# Patient Record
Sex: FEMALE | Race: WHITE | NOT HISPANIC OR LATINO
[De-identification: names, ages, dates, MRNs, and addresses within clinical notes are randomized per-mention and may not be internally consistent; named-entity substitution may affect disease eponyms.]

---

## 2017-12-20 ENCOUNTER — APPOINTMENT (OUTPATIENT)
Dept: CARDIOLOGY | Facility: CLINIC | Age: 77
End: 2017-12-20
Payer: MEDICARE

## 2017-12-20 ENCOUNTER — NON-APPOINTMENT (OUTPATIENT)
Age: 77
End: 2017-12-20

## 2017-12-20 VITALS — BODY MASS INDEX: 24.07 KG/M2 | HEIGHT: 64 IN | HEART RATE: 90 BPM | OXYGEN SATURATION: 98 % | WEIGHT: 141 LBS

## 2017-12-20 VITALS — DIASTOLIC BLOOD PRESSURE: 50 MMHG | SYSTOLIC BLOOD PRESSURE: 116 MMHG

## 2017-12-20 DIAGNOSIS — Z87.81 PERSONAL HISTORY OF (HEALED) TRAUMATIC FRACTURE: ICD-10-CM

## 2017-12-20 DIAGNOSIS — Z80.1 FAMILY HISTORY OF MALIGNANT NEOPLASM OF TRACHEA, BRONCHUS AND LUNG: ICD-10-CM

## 2017-12-20 DIAGNOSIS — Z83.1 FAMILY HISTORY OF OTHER INFECTIOUS AND PARASITIC DISEASES: ICD-10-CM

## 2017-12-20 PROCEDURE — 93000 ELECTROCARDIOGRAM COMPLETE: CPT

## 2017-12-20 PROCEDURE — 99204 OFFICE O/P NEW MOD 45 MIN: CPT | Mod: 25

## 2018-02-06 ENCOUNTER — APPOINTMENT (OUTPATIENT)
Dept: CARDIOLOGY | Facility: CLINIC | Age: 78
End: 2018-02-06
Payer: MEDICARE

## 2018-02-06 PROCEDURE — 93306 TTE W/DOPPLER COMPLETE: CPT

## 2018-02-14 ENCOUNTER — APPOINTMENT (OUTPATIENT)
Dept: CARDIOLOGY | Facility: CLINIC | Age: 78
End: 2018-02-14
Payer: MEDICARE

## 2018-02-14 VITALS — HEIGHT: 64 IN | OXYGEN SATURATION: 98 % | WEIGHT: 141 LBS | HEART RATE: 65 BPM | BODY MASS INDEX: 24.07 KG/M2

## 2018-02-14 VITALS — DIASTOLIC BLOOD PRESSURE: 80 MMHG | SYSTOLIC BLOOD PRESSURE: 148 MMHG

## 2018-02-14 PROCEDURE — 99213 OFFICE O/P EST LOW 20 MIN: CPT

## 2018-12-20 ENCOUNTER — APPOINTMENT (OUTPATIENT)
Dept: CARDIOLOGY | Facility: CLINIC | Age: 78
End: 2018-12-20
Payer: MEDICARE

## 2018-12-20 ENCOUNTER — NON-APPOINTMENT (OUTPATIENT)
Age: 78
End: 2018-12-20

## 2018-12-20 VITALS — RESPIRATION RATE: 17 BRPM | HEART RATE: 81 BPM | OXYGEN SATURATION: 97 % | HEIGHT: 64 IN

## 2018-12-20 VITALS — SYSTOLIC BLOOD PRESSURE: 150 MMHG | DIASTOLIC BLOOD PRESSURE: 80 MMHG

## 2018-12-20 PROCEDURE — 99214 OFFICE O/P EST MOD 30 MIN: CPT

## 2018-12-20 PROCEDURE — 93000 ELECTROCARDIOGRAM COMPLETE: CPT

## 2018-12-20 NOTE — REVIEW OF SYSTEMS
[see HPI] : see HPI [Shortness Of Breath] : shortness of breath [Lower Ext Edema] : lower extremity edema [Negative] : Heme/Lymph

## 2018-12-22 NOTE — DISCUSSION/SUMMARY
[FreeTextEntry1] : Presently she is hemodynamically stable.  Blood pressure is not adequately controlled.I will try and get copies of medical records from [facility] to know exactly what happened l as well as blood test reports from [facility].  Based upon that I results,I will make further recommendations about blood pressure medications.  Medication list was reconciled..

## 2018-12-22 NOTE — HISTORY OF PRESENT ILLNESS
[FreeTextEntry1] : 2 days before Thanksgiving she started getting shortness of breath.  After Thanksgiving she was admitted to [facility] because of severe shortness of breath.  In the hospital they also noted leg edema and put her on diuretic.  She was told to have heart failure.  She was in the hospital for about 2 weeks.  She was also treated with high-dose short-term prednisone for exacerbation of COPD.Since discharge she has also been getting some swelling in the legs.  Legs were very swollen yesterday morning.  She is still on the tapering dose of prednisone.She had no chest pain.  She was also seen by cardiologist while in the hospital.

## 2018-12-22 NOTE — ASSESSMENT
[FreeTextEntry1] : She is hemodynamically stable.  As per the history she had congestive heart failure.  I am not entirely convinced about it.  She also had exacerbation of COPD.  There was no mention of any acute MI during her admission.

## 2018-12-22 NOTE — REASON FOR VISIT
[Follow-Up - Clinic] : a clinic follow-up of [Hyperlipidemia] : hyperlipidemia [Hypertension] : hypertension [FreeTextEntry1] : Asthma,copd,CHF

## 2018-12-22 NOTE — PHYSICAL EXAM
[General Appearance - Well Developed] : well developed [Normal Appearance] : normal appearance [Well Groomed] : well groomed [General Appearance - Well Nourished] : well nourished [No Deformities] : no deformities [General Appearance - In No Acute Distress] : no acute distress [Normal Conjunctiva] : the conjunctiva exhibited no abnormalities [Eyelids - No Xanthelasma] : the eyelids demonstrated no xanthelasmas [Normal Oral Mucosa] : normal oral mucosa [No Oral Pallor] : no oral pallor [No Oral Cyanosis] : no oral cyanosis [Normal Jugular Venous A Waves Present] : normal jugular venous A waves present [Normal Jugular Venous V Waves Present] : normal jugular venous V waves present [No Jugular Venous Irizarry A Waves] : no jugular venous irizarry A waves [Respiration, Rhythm And Depth] : normal respiratory rhythm and effort [Exaggerated Use Of Accessory Muscles For Inspiration] : no accessory muscle use [Auscultation Breath Sounds / Voice Sounds] : lungs were clear to auscultation bilaterally [Heart Rate And Rhythm] : heart rate and rhythm were normal [Heart Sounds] : normal S1 and S2 [Murmurs] : no murmurs present [Abdomen Soft] : soft [Abdomen Tenderness] : non-tender [Abdomen Mass (___ Cm)] : no abdominal mass palpated [Abnormal Walk] : normal gait [Gait - Sufficient For Exercise Testing] : the gait was sufficient for exercise testing [Nail Clubbing] : no clubbing of the fingernails [Cyanosis, Localized] : no localized cyanosis [Petechial Hemorrhages (___cm)] : no petechial hemorrhages [Skin Color & Pigmentation] : normal skin color and pigmentation [] : no rash [No Venous Stasis] : no venous stasis [Skin Lesions] : no skin lesions [No Skin Ulcers] : no skin ulcer [No Xanthoma] : no  xanthoma was observed [Oriented To Time, Place, And Person] : oriented to person, place, and time [Affect] : the affect was normal [Mood] : the mood was normal [No Anxiety] : not feeling anxious [FreeTextEntry1] : Minimal bilateral edema.

## 2018-12-29 ENCOUNTER — MOBILE ON CALL (OUTPATIENT)
Age: 78
End: 2018-12-29

## 2019-01-07 ENCOUNTER — APPOINTMENT (OUTPATIENT)
Dept: CARDIOLOGY | Facility: CLINIC | Age: 79
End: 2019-01-07

## 2019-01-09 ENCOUNTER — APPOINTMENT (OUTPATIENT)
Dept: CARDIOLOGY | Facility: CLINIC | Age: 79
End: 2019-01-09
Payer: MEDICARE

## 2019-01-09 ENCOUNTER — LABORATORY RESULT (OUTPATIENT)
Age: 79
End: 2019-01-09

## 2019-01-09 ENCOUNTER — APPOINTMENT (OUTPATIENT)
Dept: INTERNAL MEDICINE | Facility: CLINIC | Age: 79
End: 2019-01-09
Payer: MEDICARE

## 2019-01-09 VITALS — SYSTOLIC BLOOD PRESSURE: 154 MMHG | DIASTOLIC BLOOD PRESSURE: 80 MMHG

## 2019-01-09 VITALS — OXYGEN SATURATION: 98 % | HEART RATE: 87 BPM | HEIGHT: 64 IN | BODY MASS INDEX: 28.34 KG/M2 | WEIGHT: 166 LBS

## 2019-01-09 VITALS
WEIGHT: 166 LBS | HEART RATE: 78 BPM | BODY MASS INDEX: 28.34 KG/M2 | OXYGEN SATURATION: 97 % | TEMPERATURE: 98.8 F | HEIGHT: 64 IN

## 2019-01-09 VITALS — SYSTOLIC BLOOD PRESSURE: 130 MMHG | DIASTOLIC BLOOD PRESSURE: 80 MMHG

## 2019-01-09 VITALS — SYSTOLIC BLOOD PRESSURE: 140 MMHG | DIASTOLIC BLOOD PRESSURE: 70 MMHG

## 2019-01-09 DIAGNOSIS — E86.0 DEHYDRATION: ICD-10-CM

## 2019-01-09 PROCEDURE — 99496 TRANSJ CARE MGMT HIGH F2F 7D: CPT

## 2019-01-09 PROCEDURE — 36415 COLL VENOUS BLD VENIPUNCTURE: CPT

## 2019-01-09 PROCEDURE — 99214 OFFICE O/P EST MOD 30 MIN: CPT | Mod: 25

## 2019-01-09 RX ORDER — ASPIRIN 81 MG
81 TABLET, DELAYED RELEASE (ENTERIC COATED) ORAL
Refills: 0 | Status: ACTIVE | COMMUNITY

## 2019-01-09 NOTE — REASON FOR VISIT
[Follow-Up - Clinic] : a clinic follow-up of [Hyperlipidemia] : hyperlipidemia [Hypertension] : hypertension [FreeTextEntry1] : Exacerbation of COPD, diabetes etc.

## 2019-01-09 NOTE — DISCUSSION/SUMMARY
[FreeTextEntry1] : I recommended that she see Dr. Pacheco get her medications straightened out.  She also needs to follow-up with Dr. Kelsey and get better control of her blood sugar.  I told her to discontinue Lasix for the time being.  I did not make any other changes.  I'll follow-up in 2 weeks and then make a decision regarding her medications.  All of this was explained to her daughter.  I also think that it'll be a good idea for her to be on a low-dose antidepressant as proposed by daughter.  I think it'll be extremely beneficial.  Until she is cleared once again by neurology she has been told that she cannot drive a car and that is upsetting her.

## 2019-01-09 NOTE — ASSESSMENT
[FreeTextEntry1] : currently appears clinically stable\par there are at least 3 different med lists and daughter stated she would put most recent meds in a bag and bring them to office tomorrow.\par will stop lasix now given BUN elevation.

## 2019-01-09 NOTE — HISTORY OF PRESENT ILLNESS
[FreeTextEntry1] : She was admitted to [facility] for exacerbation of COPD.  Week earlier she had been discharged from [facility] with the same diagnoses.  She was discharged finally on 28 December.  On 7 January when she was on her way to the office she turned gray.  She was also dizzy and short of breath.  She was taken to [facility] emergency room where her blood pressure was 123/53.\par \par The first doctor who saw her wanted to admit her overnight in evaluate and see she needed therapy.  However when his shift was over and some other doctor to go over he decided to discharge her the same night.\par \par Since discharge she has not been feeling her normal self.  Her breathing is okay.  She finished prednisone On second of January.  Blood tests done on 7 January at [facility] showed blood glucose of 353 with BUN of 70 and creatinine of 1.5.  Ago the high blood sugar me still have been from their high dose prednisone that she had been getting.\par \par She has been drinking a good amount of water.  Her fingers sometimes cramp up.  Her current medications include amlodipine 10 mg daily, Brilinta 90 mg twice a day, flaxseed 5 mg once a day, ramipril 10 mg daily and furosemide 40 mg once a day.She is also on Levemir 20 units at bedtime, Repaglinide 2 mg 3 times a day\par \par She has an extensive list of medications and I am not sure what she is taking and what is not.

## 2019-01-09 NOTE — PLAN
[FreeTextEntry1] : blood sent for routine labs\par decision re meds with labs and current meds tomorrow

## 2019-01-09 NOTE — ASSESSMENT
[FreeTextEntry1] : Her blood pressure is slightly because of anxiety and stress.  She appears dehydrated.  Diabetes is out of control.  She is also taking Lasix.  She is on multiple medications and I'm not sure which one she is taking and which ones she is not.

## 2019-01-09 NOTE — HEALTH RISK ASSESSMENT
[Any fall with injury in past year] : Patient reported fall with injury in the past year [0] : 1) Little interest or pleasure doing things: Not at all (0) [3] : 2) Feeling down, depressed, or hopeless for nearly every day (3) [] : No [de-identified] : Tripped and fractured her arm. [[id number]] : 3

## 2019-01-09 NOTE — PHYSICAL EXAM
[No Acute Distress] : no acute distress [Well-Appearing] : well-appearing [No Respiratory Distress] : no respiratory distress  [Clear to Auscultation] : lungs were clear to auscultation bilaterally [Soft] : abdomen soft [Non Tender] : non-tender [No Focal Deficits] : no focal deficits [Alert and Oriented x3] : oriented to person, place, and time [Normal Sclera/Conjunctiva] : normal sclera/conjunctiva [Normal Rate] : normal rate  [Regular Rhythm] : with a regular rhythm [No Edema] : there was no peripheral edema [Normal Supraclavicular Nodes] : no supraclavicular lymphadenopathy [Normal Posterior Cervical Nodes] : no posterior cervical lymphadenopathy [No CVA Tenderness] : no CVA  tenderness [No Joint Swelling] : no joint swelling [Normal Affect] : the affect was normal

## 2019-01-09 NOTE — REVIEW OF SYSTEMS
[Fever] : no fever [Chest Pain] : no chest pain [Shortness Of Breath] : no shortness of breath [Dysuria] : no dysuria [Joint Pain] : no joint pain [Anxiety] : anxiety [Depression] : depression

## 2019-01-09 NOTE — HISTORY OF PRESENT ILLNESS
[FreeTextEntry1] : follow up visit. only acute complaint of depression at times.  [de-identified] : 77 yo female with complex recent history including 2 recent hospitalizatios[[facility] and [facility]] with past history diabetes/COPD/HTN/CAD.\par seen earlier today and case d/w cardiology, multiple lists of meds and neither the patient or the daughter are clear as to what she has been taking recently. given elevated BUN at [facility] was told to stop the lasix.  \par cuerrentlt awake alert no distress

## 2019-01-10 ENCOUNTER — MEDICATION RENEWAL (OUTPATIENT)
Age: 79
End: 2019-01-10

## 2019-01-10 ENCOUNTER — RX CHANGE (OUTPATIENT)
Age: 79
End: 2019-01-10

## 2019-01-10 RX ORDER — INSULIN GLARGINE 100 [IU]/ML
INJECTION, SOLUTION SUBCUTANEOUS
Refills: 0 | Status: DISCONTINUED | COMMUNITY
End: 2019-01-10

## 2019-01-23 ENCOUNTER — MEDICATION RENEWAL (OUTPATIENT)
Age: 79
End: 2019-01-23

## 2019-01-28 ENCOUNTER — APPOINTMENT (OUTPATIENT)
Dept: INTERNAL MEDICINE | Facility: CLINIC | Age: 79
End: 2019-01-28
Payer: MEDICARE

## 2019-01-28 ENCOUNTER — APPOINTMENT (OUTPATIENT)
Dept: CARDIOLOGY | Facility: CLINIC | Age: 79
End: 2019-01-28
Payer: MEDICARE

## 2019-01-28 ENCOUNTER — LABORATORY RESULT (OUTPATIENT)
Age: 79
End: 2019-01-28

## 2019-01-28 VITALS
BODY MASS INDEX: 28.68 KG/M2 | OXYGEN SATURATION: 98 % | RESPIRATION RATE: 16 BRPM | HEIGHT: 64 IN | HEART RATE: 64 BPM | WEIGHT: 168 LBS

## 2019-01-28 VITALS
BODY MASS INDEX: 28.68 KG/M2 | OXYGEN SATURATION: 98 % | WEIGHT: 168 LBS | HEIGHT: 64 IN | TEMPERATURE: 97.9 F | HEART RATE: 64 BPM

## 2019-01-28 VITALS — SYSTOLIC BLOOD PRESSURE: 128 MMHG | DIASTOLIC BLOOD PRESSURE: 50 MMHG

## 2019-01-28 VITALS — SYSTOLIC BLOOD PRESSURE: 128 MMHG | DIASTOLIC BLOOD PRESSURE: 80 MMHG

## 2019-01-28 DIAGNOSIS — R42 DIZZINESS AND GIDDINESS: ICD-10-CM

## 2019-01-28 PROCEDURE — 36415 COLL VENOUS BLD VENIPUNCTURE: CPT

## 2019-01-28 PROCEDURE — 99214 OFFICE O/P EST MOD 30 MIN: CPT

## 2019-01-28 PROCEDURE — 99214 OFFICE O/P EST MOD 30 MIN: CPT | Mod: 25

## 2019-01-28 RX ORDER — DAPAGLIFLOZIN 5 MG/1
5 TABLET, FILM COATED ORAL DAILY
Refills: 0 | Status: DISCONTINUED | COMMUNITY
End: 2019-01-28

## 2019-01-28 NOTE — PHYSICAL EXAM
[No Acute Distress] : no acute distress [No JVD] : no jugular venous distention [No Respiratory Distress] : no respiratory distress  [Clear to Auscultation] : lungs were clear to auscultation bilaterally [Normal Rate] : normal rate  [Regular Rhythm] : with a regular rhythm [No Edema] : there was no peripheral edema [Soft] : abdomen soft [Non Tender] : non-tender [No Focal Deficits] : no focal deficits [Alert and Oriented x3] : oriented to person, place, and time

## 2019-01-28 NOTE — HISTORY OF PRESENT ILLNESS
[FreeTextEntry1] : She is presently asymptomatic.  In early January she was gray and dizzy and tired.  She saw Dr. Pacheco at that time.  It seemed like there is some confusion about what medications to take.  Blood tests were done. Hemoglobin was 11.2, vitamin D level was 14.2 and was low, hemoglobin A1c was 10.3, triglycerides of 198, total cholesterol 293 and .  HDL was 75.Potassium was 5.9, glucose was 260, BUN 44 and creatinine 1.29.  Estimated GFR was 40 consistent with chronic kidney disease stage III.\par \par Dr. Pacheco stopped few of her meds and gave her Kayexalate.  She is getting repeat potassium level today.

## 2019-01-28 NOTE — REASON FOR VISIT
[Follow-Up - Clinic] : a clinic follow-up of [Dyspnea] : dyspnea [Heart Failure] : congestive heart failure [Hyperlipidemia] : hyperlipidemia [Hypertension] : hypertension [FreeTextEntry1] : DM, COPD hyperkalemia.

## 2019-01-28 NOTE — ASSESSMENT
[FreeTextEntry1] : This is a 79 yo female with multiple medical problems presenting today for routine evaluation. No acute distress or medical complaints are noted at this time.

## 2019-01-28 NOTE — HEALTH RISK ASSESSMENT
[No falls in past year] : Patient reported no falls in the past year [0] : 2) Feeling down, depressed, or hopeless: Not at all (0) [] : No [[id number]] : 0

## 2019-01-28 NOTE — PHYSICAL EXAM
[General Appearance - Well Developed] : well developed [Normal Appearance] : normal appearance [Well Groomed] : well groomed [General Appearance - Well Nourished] : well nourished [No Deformities] : no deformities [General Appearance - In No Acute Distress] : no acute distress [Normal Conjunctiva] : the conjunctiva exhibited no abnormalities [Eyelids - No Xanthelasma] : the eyelids demonstrated no xanthelasmas [Normal Oral Mucosa] : normal oral mucosa [No Oral Pallor] : no oral pallor [No Oral Cyanosis] : no oral cyanosis [Normal Jugular Venous A Waves Present] : normal jugular venous A waves present [Normal Jugular Venous V Waves Present] : normal jugular venous V waves present [No Jugular Venous Iirzarry A Waves] : no jugular venous irizarry A waves [Respiration, Rhythm And Depth] : normal respiratory rhythm and effort [Exaggerated Use Of Accessory Muscles For Inspiration] : no accessory muscle use [Auscultation Breath Sounds / Voice Sounds] : lungs were clear to auscultation bilaterally [Heart Rate And Rhythm] : heart rate and rhythm were normal [Heart Sounds] : normal S1 and S2 [Murmurs] : no murmurs present [Abdomen Soft] : soft [Abdomen Tenderness] : non-tender [Abdomen Mass (___ Cm)] : no abdominal mass palpated [Abnormal Walk] : normal gait [Gait - Sufficient For Exercise Testing] : the gait was sufficient for exercise testing [Nail Clubbing] : no clubbing of the fingernails [Cyanosis, Localized] : no localized cyanosis [Petechial Hemorrhages (___cm)] : no petechial hemorrhages [FreeTextEntry1] : Minimal bilateral edema. [Skin Color & Pigmentation] : normal skin color and pigmentation [] : no rash [No Venous Stasis] : no venous stasis [Skin Lesions] : no skin lesions [No Skin Ulcers] : no skin ulcer [No Xanthoma] : no  xanthoma was observed [Oriented To Time, Place, And Person] : oriented to person, place, and time [Affect] : the affect was normal [Mood] : the mood was normal [No Anxiety] : not feeling anxious

## 2019-01-28 NOTE — HISTORY OF PRESENT ILLNESS
[FreeTextEntry1] : routine check up.  [de-identified] : 79 yo female is in no acute distress or has any medical complaints today. Patient is here for routine evaluation and blood work for follow up of hyperkalemia

## 2019-01-28 NOTE — REVIEW OF SYSTEMS
[Fever] : no fever [Chest Pain] : no chest pain [Shortness Of Breath] : no shortness of breath [Wheezing] : no wheezing [Abdominal Pain] : no abdominal pain [Dysuria] : no dysuria

## 2019-01-28 NOTE — ASSESSMENT
[FreeTextEntry1] : Her blood pressure is okay.  She doesn't appear to be in any fluid overload.  She was treated for hyperkalemia which is probably from ramipril. She was told to stop ramipril.This is to be some confusion.  She may have continued to take ramipril until today.She was also dehydrated and that may also have contributed to hyperkalemia.\par \par

## 2019-02-06 LAB
25(OH)D3 SERPL-MCNC: 14.2 NG/ML
25(OH)D3 SERPL-MCNC: 19.8 NG/ML
ALBUMIN SERPL ELPH-MCNC: 3.8 G/DL
ALBUMIN SERPL ELPH-MCNC: 4.1 G/DL
ALP BLD-CCNC: 106 U/L
ALP BLD-CCNC: 99 U/L
ALT SERPL-CCNC: 14 U/L
ALT SERPL-CCNC: 8 U/L
ANION GAP SERPL CALC-SCNC: 15 MMOL/L
ANION GAP SERPL CALC-SCNC: 20 MMOL/L
AST SERPL-CCNC: 16 U/L
AST SERPL-CCNC: 20 U/L
BASOPHILS # BLD AUTO: 0 K/UL
BASOPHILS # BLD AUTO: 0 K/UL
BASOPHILS NFR BLD AUTO: 0 %
BASOPHILS NFR BLD AUTO: 0 %
BILIRUB SERPL-MCNC: 0.3 MG/DL
BILIRUB SERPL-MCNC: 0.5 MG/DL
BUN SERPL-MCNC: 32 MG/DL
BUN SERPL-MCNC: 44 MG/DL
CALCIUM SERPL-MCNC: 8.7 MG/DL
CALCIUM SERPL-MCNC: 9.3 MG/DL
CHLORIDE SERPL-SCNC: 100 MMOL/L
CHLORIDE SERPL-SCNC: 108 MMOL/L
CHOLEST SERPL-MCNC: 282 MG/DL
CHOLEST SERPL-MCNC: 293 MG/DL
CHOLEST/HDLC SERPL: 3.9 RATIO
CHOLEST/HDLC SERPL: 4.9 RATIO
CO2 SERPL-SCNC: 17 MMOL/L
CO2 SERPL-SCNC: 18 MMOL/L
CREAT SERPL-MCNC: 1.16 MG/DL
CREAT SERPL-MCNC: 1.29 MG/DL
EOSINOPHIL # BLD AUTO: 0 K/UL
EOSINOPHIL # BLD AUTO: 0.08 K/UL
EOSINOPHIL NFR BLD AUTO: 0 %
EOSINOPHIL NFR BLD AUTO: 0.9 %
GLUCOSE SERPL-MCNC: 105 MG/DL
GLUCOSE SERPL-MCNC: 260 MG/DL
HBA1C MFR BLD HPLC: 10.3 %
HBA1C MFR BLD HPLC: 8.9 %
HCT VFR BLD CALC: 33.8 %
HCT VFR BLD CALC: 35.3 %
HDLC SERPL-MCNC: 58 MG/DL
HDLC SERPL-MCNC: 75 MG/DL
HGB BLD-MCNC: 11 G/DL
HGB BLD-MCNC: 11.2 G/DL
LDLC SERPL CALC-MCNC: 178 MG/DL
LDLC SERPL CALC-MCNC: 196 MG/DL
LYMPHOCYTES # BLD AUTO: 1.37 K/UL
LYMPHOCYTES # BLD AUTO: 2.13 K/UL
LYMPHOCYTES NFR BLD AUTO: 15.7 %
LYMPHOCYTES NFR BLD AUTO: 25 %
MAN DIFF?: NORMAL
MAN DIFF?: NORMAL
MCHC RBC-ENTMCNC: 28.9 PG
MCHC RBC-ENTMCNC: 28.9 PG
MCHC RBC-ENTMCNC: 31.7 GM/DL
MCHC RBC-ENTMCNC: 32.5 GM/DL
MCV RBC AUTO: 88.9 FL
MCV RBC AUTO: 91.2 FL
MONOCYTES # BLD AUTO: 0.61 K/UL
MONOCYTES # BLD AUTO: 0.89 K/UL
MONOCYTES NFR BLD AUTO: 10.5 %
MONOCYTES NFR BLD AUTO: 7 %
NEUTROPHILS # BLD AUTO: 5.41 K/UL
NEUTROPHILS # BLD AUTO: 6.54 K/UL
NEUTROPHILS NFR BLD AUTO: 54 %
NEUTROPHILS NFR BLD AUTO: 74.7 %
PLATELET # BLD AUTO: 265 K/UL
PLATELET # BLD AUTO: 399 K/UL
POTASSIUM SERPL-SCNC: 5 MMOL/L
POTASSIUM SERPL-SCNC: 5.9 MMOL/L
PROT SERPL-MCNC: 5.9 G/DL
PROT SERPL-MCNC: 6.2 G/DL
RBC # BLD: 3.8 M/UL
RBC # BLD: 3.87 M/UL
RBC # FLD: 14.8 %
RBC # FLD: 15.1 %
SODIUM SERPL-SCNC: 137 MMOL/L
SODIUM SERPL-SCNC: 141 MMOL/L
T4 FREE SERPL-MCNC: 1.2 NG/DL
T4 SERPL-MCNC: 7 UG/DL
T4 SERPL-MCNC: 7.8 UG/DL
TRIGL SERPL-MCNC: 138 MG/DL
TRIGL SERPL-MCNC: 198 MG/DL
TSH SERPL-ACNC: 1.99 UIU/ML
TSH SERPL-ACNC: 2.67 UIU/ML
WBC # FLD AUTO: 8.5 K/UL
WBC # FLD AUTO: 8.75 K/UL

## 2019-02-11 ENCOUNTER — MEDICATION RENEWAL (OUTPATIENT)
Age: 79
End: 2019-02-11

## 2019-02-11 RX ORDER — ROSUVASTATIN CALCIUM 10 MG/1
10 TABLET, FILM COATED ORAL DAILY
Qty: 30 | Refills: 2 | Status: DISCONTINUED | COMMUNITY
Start: 2019-02-06 | End: 2019-02-11

## 2019-02-21 ENCOUNTER — RX RENEWAL (OUTPATIENT)
Age: 79
End: 2019-02-21

## 2019-02-25 ENCOUNTER — APPOINTMENT (OUTPATIENT)
Dept: CARDIOLOGY | Facility: CLINIC | Age: 79
End: 2019-02-25

## 2019-03-07 ENCOUNTER — RX RENEWAL (OUTPATIENT)
Age: 79
End: 2019-03-07

## 2019-03-07 ENCOUNTER — MEDICATION RENEWAL (OUTPATIENT)
Age: 79
End: 2019-03-07

## 2019-03-24 ENCOUNTER — RX RENEWAL (OUTPATIENT)
Age: 79
End: 2019-03-24

## 2019-04-02 ENCOUNTER — APPOINTMENT (OUTPATIENT)
Dept: INTERNAL MEDICINE | Facility: CLINIC | Age: 79
End: 2019-04-02
Payer: MEDICARE

## 2019-04-02 ENCOUNTER — RX RENEWAL (OUTPATIENT)
Age: 79
End: 2019-04-02

## 2019-04-02 PROCEDURE — 36415 COLL VENOUS BLD VENIPUNCTURE: CPT

## 2019-04-03 LAB
ALBUMIN SERPL ELPH-MCNC: 3.9 G/DL
ALP BLD-CCNC: 114 U/L
ALT SERPL-CCNC: 8 U/L
ANION GAP SERPL CALC-SCNC: 16 MMOL/L
AST SERPL-CCNC: 18 U/L
BILIRUB SERPL-MCNC: 0.2 MG/DL
BUN SERPL-MCNC: 21 MG/DL
CALCIUM SERPL-MCNC: 8.3 MG/DL
CHLORIDE SERPL-SCNC: 109 MMOL/L
CHOLEST SERPL-MCNC: 201 MG/DL
CHOLEST/HDLC SERPL: 3.1 RATIO
CO2 SERPL-SCNC: 20 MMOL/L
CREAT SERPL-MCNC: 1.2 MG/DL
ESTIMATED AVERAGE GLUCOSE: 157 MG/DL
GLUCOSE SERPL-MCNC: 103 MG/DL
HBA1C MFR BLD HPLC: 7.1 %
HDLC SERPL-MCNC: 64 MG/DL
LDLC SERPL CALC-MCNC: 113 MG/DL
POTASSIUM SERPL-SCNC: 3.9 MMOL/L
PROT SERPL-MCNC: 6 G/DL
SODIUM SERPL-SCNC: 145 MMOL/L
TRIGL SERPL-MCNC: 119 MG/DL

## 2019-04-08 ENCOUNTER — RX RENEWAL (OUTPATIENT)
Age: 79
End: 2019-04-08

## 2019-04-08 ENCOUNTER — APPOINTMENT (OUTPATIENT)
Dept: INTERNAL MEDICINE | Facility: CLINIC | Age: 79
End: 2019-04-08
Payer: MEDICARE

## 2019-04-08 VITALS
BODY MASS INDEX: 27.31 KG/M2 | HEIGHT: 64 IN | OXYGEN SATURATION: 97 % | HEART RATE: 65 BPM | WEIGHT: 160 LBS | TEMPERATURE: 97.5 F

## 2019-04-08 VITALS — DIASTOLIC BLOOD PRESSURE: 70 MMHG | SYSTOLIC BLOOD PRESSURE: 110 MMHG

## 2019-04-08 PROCEDURE — 93000 ELECTROCARDIOGRAM COMPLETE: CPT

## 2019-04-08 PROCEDURE — 99214 OFFICE O/P EST MOD 30 MIN: CPT | Mod: 25

## 2019-04-08 NOTE — ASSESSMENT
[FreeTextEntry1] : 77 y/o female presents for routine follow-up and to discuss blood work.\par Patients blood pressure is well-controlled with medications and lifestyle changes.\par Patients cholesterol levels and blood glucose levels are also well controlled with medications.\par Upon evaluation, occasional skipped heart beat was auscultated and EKG was ordered to evaluate. \par No other acute medical complaints are expressed by patient.

## 2019-04-08 NOTE — PHYSICAL EXAM
[No Acute Distress] : no acute distress [Well Developed] : well developed [Well-Appearing] : well-appearing [No JVD] : no jugular venous distention [No Respiratory Distress] : no respiratory distress  [Clear to Auscultation] : lungs were clear to auscultation bilaterally [No Accessory Muscle Use] : no accessory muscle use [Normal Rate] : normal rate  [No Murmur] : no murmur heard [No Edema] : there was no peripheral edema [Soft] : abdomen soft [Non Tender] : non-tender [No Focal Deficits] : no focal deficits [Normal Affect] : the affect was normal [Alert and Oriented x3] : oriented to person, place, and time [de-identified] : occasional skipped heart beat auscultated.

## 2019-04-08 NOTE — REVIEW OF SYSTEMS
[Hearing Loss] : hearing loss [Negative] : Heme/Lymph [Fever] : no fever [Chills] : no chills [Fatigue] : no fatigue [Discharge] : no discharge [Pain] : no pain [Earache] : no earache [Hoarseness] : no hoarseness [Chest Pain] : no chest pain [Palpitations] : no palpitations [Shortness Of Breath] : no shortness of breath [Wheezing] : no wheezing [Cough] : no cough [Abdominal Pain] : no abdominal pain [Diarrhea] : diarrhea [Vomiting] : no vomiting [Dysuria] : no dysuria

## 2019-04-08 NOTE — PLAN
[FreeTextEntry1] : EKG done showed NSR.\par Continue medications as listed.\par RTC in 6 months. \par

## 2019-04-08 NOTE — HISTORY OF PRESENT ILLNESS
[FreeTextEntry1] : 77 y/o female presents for routine follow up.  [de-identified] : Ms. Isaac is a 79 y/o female who presents to the office for a routine follow up, mainly to discuss blood work results.\par Patient states she has lost about 5-8 pounds and that she handles her own medications. \par No other acute medical concerns are expressed by the patient. \par

## 2019-04-08 NOTE — HEALTH RISK ASSESSMENT
[No falls in past year] : Patient reported no falls in the past year [0] : 2) Feeling down, depressed, or hopeless: Not at all (0) [] : No [[id number]] : 0

## 2019-04-10 ENCOUNTER — APPOINTMENT (OUTPATIENT)
Dept: INTERNAL MEDICINE | Facility: CLINIC | Age: 79
End: 2019-04-10

## 2019-04-25 ENCOUNTER — MEDICATION RENEWAL (OUTPATIENT)
Age: 79
End: 2019-04-25

## 2019-05-01 ENCOUNTER — MEDICATION RENEWAL (OUTPATIENT)
Age: 79
End: 2019-05-01

## 2019-05-01 ENCOUNTER — RX CHANGE (OUTPATIENT)
Age: 79
End: 2019-05-01

## 2019-05-15 ENCOUNTER — APPOINTMENT (OUTPATIENT)
Dept: INTERNAL MEDICINE | Facility: CLINIC | Age: 79
End: 2019-05-15
Payer: MEDICARE

## 2019-05-15 VITALS
HEIGHT: 64 IN | HEART RATE: 70 BPM | TEMPERATURE: 98.2 F | OXYGEN SATURATION: 98 % | BODY MASS INDEX: 26.8 KG/M2 | WEIGHT: 157 LBS

## 2019-05-15 VITALS — DIASTOLIC BLOOD PRESSURE: 80 MMHG | SYSTOLIC BLOOD PRESSURE: 120 MMHG

## 2019-05-15 DIAGNOSIS — Z86.73 PERSONAL HISTORY OF TRANSIENT ISCHEMIC ATTACK (TIA), AND CEREBRAL INFARCTION W/OUT RESIDUAL DEFICITS: ICD-10-CM

## 2019-05-15 DIAGNOSIS — Z87.891 PERSONAL HISTORY OF NICOTINE DEPENDENCE: ICD-10-CM

## 2019-05-15 PROCEDURE — 36415 COLL VENOUS BLD VENIPUNCTURE: CPT

## 2019-05-15 PROCEDURE — 99496 TRANSJ CARE MGMT HIGH F2F 7D: CPT | Mod: 25

## 2019-05-15 NOTE — PLAN
[FreeTextEntry1] : -Pt will call the office for medication refills. \par -Arrange for home visiting nurse referral. \par -Routine blood work sent to lab. \par -Follow up in 1 month.

## 2019-05-15 NOTE — ASSESSMENT
[FreeTextEntry1] : 77 yo female presenting s/p hospital d/c for TIA on 5/2/19.  \par Routine blood work to be drawn including b12 and folate, cbc and iron studies.  \par At the time of the visit, pt is neurologically intact without any deficits.

## 2019-05-15 NOTE — HISTORY OF PRESENT ILLNESS
[Admitted on: ___] : The patient was admitted on [unfilled] [Post-hospitalization from ___ Hospital] : Post-hospitalization from [unfilled] Hospital [Discharged on ___] : discharged on [unfilled] [FreeTextEntry2] : Ms. Isaac is a 79 yo female with history of asthma and T2DM presenting for hospital follow up d/c on 5/2/19 for TIA.\par Pt states she is feeling fatigued since being discharged, otherwise denies acute medical complaints.  \par Pt denies dysphagia, confusion, vision loss or paralysis.

## 2019-05-15 NOTE — PHYSICAL EXAM
[No Acute Distress] : no acute distress [Well Nourished] : well nourished [Well-Appearing] : well-appearing [Well Developed] : well developed [Normal Sclera/Conjunctiva] : normal sclera/conjunctiva [Normal Outer Ear/Nose] : the outer ears and nose were normal in appearance [No JVD] : no jugular venous distention [Clear to Auscultation] : lungs were clear to auscultation bilaterally [No Accessory Muscle Use] : no accessory muscle use [No Respiratory Distress] : no respiratory distress  [Regular Rhythm] : with a regular rhythm [Normal Rate] : normal rate  [Normal S1, S2] : normal S1 and S2 [No Murmur] : no murmur heard [No Varicosities] : no varicosities [No Edema] : there was no peripheral edema [No Extremity Clubbing/Cyanosis] : no extremity clubbing/cyanosis [Soft] : abdomen soft [Non Tender] : non-tender [Non-distended] : non-distended [No Masses] : no abdominal mass palpated [No HSM] : no HSM [Normal Bowel Sounds] : normal bowel sounds [No Joint Swelling] : no joint swelling [Grossly Normal Strength/Tone] : grossly normal strength/tone [No Rash] : no rash [Normal Gait] : normal gait [Coordination Grossly Intact] : coordination grossly intact [No Focal Deficits] : no focal deficits [Normal Affect] : the affect was normal [Alert and Oriented x3] : oriented to person, place, and time [Normal Insight/Judgement] : insight and judgment were intact [Speech Grossly Normal] : speech grossly normal [Memory Grossly Normal] : memory grossly normal

## 2019-05-23 NOTE — DISCUSSION/SUMMARY
[FreeTextEntry1] : I agree that she should remain off ramipril for the time being.  I'll follow-up in a month.  She should still have basic metabolic panel done today.\par \par She tried making appointments with Dr. Kelsey but was unsuccessful.  I gave her the name of Dr. Shirley.Her medication list was reconciled.
20

## 2019-05-28 ENCOUNTER — MEDICATION RENEWAL (OUTPATIENT)
Age: 79
End: 2019-05-28

## 2019-05-28 ENCOUNTER — RX RENEWAL (OUTPATIENT)
Age: 79
End: 2019-05-28

## 2019-06-09 LAB
ALBUMIN SERPL ELPH-MCNC: 4.2 G/DL
ALP BLD-CCNC: 86 U/L
ALT SERPL-CCNC: 7 U/L
ANION GAP SERPL CALC-SCNC: 14 MMOL/L
AST SERPL-CCNC: 18 U/L
BASOPHILS # BLD AUTO: 0.07 K/UL
BASOPHILS NFR BLD AUTO: 0.6 %
BILIRUB SERPL-MCNC: 0.3 MG/DL
BUN SERPL-MCNC: 34 MG/DL
CALCIUM SERPL-MCNC: 9 MG/DL
CHLORIDE SERPL-SCNC: 110 MMOL/L
CHOLEST SERPL-MCNC: 165 MG/DL
CHOLEST/HDLC SERPL: 2.3 RATIO
CO2 SERPL-SCNC: 18 MMOL/L
CREAT SERPL-MCNC: 1.28 MG/DL
EOSINOPHIL # BLD AUTO: 0.25 K/UL
EOSINOPHIL NFR BLD AUTO: 2.3 %
ESTIMATED AVERAGE GLUCOSE: 189 MG/DL
GLUCOSE SERPL-MCNC: 81 MG/DL
HBA1C MFR BLD HPLC: 8.2 %
HCT VFR BLD CALC: 39.4 %
HDLC SERPL-MCNC: 73 MG/DL
HGB BLD-MCNC: 12.2 G/DL
IMM GRANULOCYTES NFR BLD AUTO: 0.6 %
LDLC SERPL CALC-MCNC: 75 MG/DL
LYMPHOCYTES # BLD AUTO: 1.99 K/UL
LYMPHOCYTES NFR BLD AUTO: 18.3 %
MAN DIFF?: NORMAL
MCHC RBC-ENTMCNC: 28.2 PG
MCHC RBC-ENTMCNC: 31 GM/DL
MCV RBC AUTO: 91 FL
MONOCYTES # BLD AUTO: 0.6 K/UL
MONOCYTES NFR BLD AUTO: 5.5 %
NEUTROPHILS # BLD AUTO: 7.88 K/UL
NEUTROPHILS NFR BLD AUTO: 72.7 %
PLATELET # BLD AUTO: 422 K/UL
POTASSIUM SERPL-SCNC: 4.6 MMOL/L
PROT SERPL-MCNC: 6.4 G/DL
RBC # BLD: 4.33 M/UL
RBC # FLD: 14.6 %
SODIUM SERPL-SCNC: 142 MMOL/L
TRIGL SERPL-MCNC: 86 MG/DL
TSH SERPL-ACNC: 3.14 UIU/ML
WBC # FLD AUTO: 10.86 K/UL

## 2019-06-12 ENCOUNTER — APPOINTMENT (OUTPATIENT)
Dept: INTERNAL MEDICINE | Facility: CLINIC | Age: 79
End: 2019-06-12
Payer: MEDICARE

## 2019-06-12 VITALS — SYSTOLIC BLOOD PRESSURE: 130 MMHG | DIASTOLIC BLOOD PRESSURE: 80 MMHG

## 2019-06-12 VITALS
HEART RATE: 68 BPM | WEIGHT: 161 LBS | BODY MASS INDEX: 27.49 KG/M2 | HEIGHT: 64 IN | TEMPERATURE: 98.2 F | OXYGEN SATURATION: 98 %

## 2019-06-12 DIAGNOSIS — Z00.00 ENCOUNTER FOR GENERAL ADULT MEDICAL EXAMINATION W/OUT ABNORMAL FINDINGS: ICD-10-CM

## 2019-06-12 PROCEDURE — 36415 COLL VENOUS BLD VENIPUNCTURE: CPT

## 2019-06-12 PROCEDURE — 99214 OFFICE O/P EST MOD 30 MIN: CPT | Mod: 25

## 2019-06-12 NOTE — PHYSICAL EXAM
[No Acute Distress] : no acute distress [Well-Appearing] : well-appearing [Normal Outer Ear/Nose] : the outer ears and nose were normal in appearance [Normal Sclera/Conjunctiva] : normal sclera/conjunctiva [No JVD] : no jugular venous distention [No Lymphadenopathy] : no lymphadenopathy [No Respiratory Distress] : no respiratory distress  [Thyroid Normal, No Nodules] : the thyroid was normal and there were no nodules present [Clear to Auscultation] : lungs were clear to auscultation bilaterally [No Accessory Muscle Use] : no accessory muscle use [Regular Rhythm] : with a regular rhythm [No Edema] : there was no peripheral edema [No Murmur] : no murmur heard [Soft] : abdomen soft [Non Tender] : non-tender [Non-distended] : non-distended [Normal Posterior Cervical Nodes] : no posterior cervical lymphadenopathy [Normal Supraclavicular Nodes] : no supraclavicular lymphadenopathy [Normal Anterior Cervical Nodes] : no anterior cervical lymphadenopathy [No CVA Tenderness] : no CVA  tenderness [No Rash] : no rash [No Joint Swelling] : no joint swelling [No Focal Deficits] : no focal deficits [Alert and Oriented x3] : oriented to person, place, and time [de-identified] : alert

## 2019-06-12 NOTE — ASSESSMENT
[FreeTextEntry1] : 79yo F with complex medical history including recent TIA, asthma, insulin dependent diabetes, and HTN presents to the office for routine follow up without any specific acute complaint.\par On exam, patient appears well.\par Has been getting good results with Spiriva and is requesting refill.

## 2019-06-12 NOTE — HEALTH RISK ASSESSMENT
[No falls in past year] : Patient reported no falls in the past year [0] : 2) Feeling down, depressed, or hopeless: Not at all (0) [] : No [[id number]] : 0 [de-identified] : no

## 2019-06-12 NOTE — PLAN
[FreeTextEntry1] : Blood sent for routine lab\par Spiriva refill\par Repaglinide refill\par RTC 6 weeks

## 2019-06-12 NOTE — HISTORY OF PRESENT ILLNESS
[Family Member] : family member [FreeTextEntry1] : routine one month evaluation [de-identified] : Ms. Isaac is a 77yo F with a complex medical history including asthma and T2DM presenting for a one month routine evaluation.\par She is s/p hospitalization for a TIA in May at [facility].\par She has no acute complaints at this time.\par Patient states she no longer takes Ventolin and began Spiriva with optimal results.

## 2019-06-15 LAB
ALBUMIN SERPL ELPH-MCNC: 4.3 G/DL
ALP BLD-CCNC: 96 U/L
ALT SERPL-CCNC: 8 U/L
ANION GAP SERPL CALC-SCNC: 13 MMOL/L
AST SERPL-CCNC: 10 U/L
BASOPHILS # BLD AUTO: 0.06 K/UL
BASOPHILS NFR BLD AUTO: 0.7 %
BILIRUB SERPL-MCNC: 0.2 MG/DL
BUN SERPL-MCNC: 36 MG/DL
CALCIUM SERPL-MCNC: 9 MG/DL
CHLORIDE SERPL-SCNC: 107 MMOL/L
CHOLEST SERPL-MCNC: 181 MG/DL
CHOLEST/HDLC SERPL: 2.5 RATIO
CO2 SERPL-SCNC: 19 MMOL/L
CREAT SERPL-MCNC: 1.24 MG/DL
EOSINOPHIL # BLD AUTO: 0.23 K/UL
EOSINOPHIL NFR BLD AUTO: 2.6 %
ESTIMATED AVERAGE GLUCOSE: 194 MG/DL
GLUCOSE SERPL-MCNC: 155 MG/DL
HBA1C MFR BLD HPLC: 8.4 %
HCT VFR BLD CALC: 37.6 %
HDLC SERPL-MCNC: 73 MG/DL
HGB BLD-MCNC: 12.1 G/DL
IMM GRANULOCYTES NFR BLD AUTO: 1.5 %
LDLC SERPL CALC-MCNC: 80 MG/DL
LYMPHOCYTES # BLD AUTO: 1.65 K/UL
LYMPHOCYTES NFR BLD AUTO: 18.7 %
MAN DIFF?: NORMAL
MCHC RBC-ENTMCNC: 29.2 PG
MCHC RBC-ENTMCNC: 32.2 GM/DL
MCV RBC AUTO: 90.8 FL
MONOCYTES # BLD AUTO: 0.64 K/UL
MONOCYTES NFR BLD AUTO: 7.2 %
NEUTROPHILS # BLD AUTO: 6.13 K/UL
NEUTROPHILS NFR BLD AUTO: 69.3 %
PLATELET # BLD AUTO: 317 K/UL
POTASSIUM SERPL-SCNC: 4.6 MMOL/L
PROT SERPL-MCNC: 6.5 G/DL
RBC # BLD: 4.14 M/UL
RBC # FLD: 14.7 %
SODIUM SERPL-SCNC: 139 MMOL/L
T4 FREE SERPL-MCNC: 1 NG/DL
TRIGL SERPL-MCNC: 140 MG/DL
TSH SERPL-ACNC: 3.81 UIU/ML
WBC # FLD AUTO: 8.84 K/UL

## 2019-06-20 ENCOUNTER — MEDICATION RENEWAL (OUTPATIENT)
Age: 79
End: 2019-06-20

## 2019-07-24 ENCOUNTER — APPOINTMENT (OUTPATIENT)
Dept: INTERNAL MEDICINE | Facility: CLINIC | Age: 79
End: 2019-07-24
Payer: MEDICARE

## 2019-07-24 VITALS
SYSTOLIC BLOOD PRESSURE: 122 MMHG | HEART RATE: 77 BPM | DIASTOLIC BLOOD PRESSURE: 80 MMHG | WEIGHT: 160 LBS | OXYGEN SATURATION: 97 % | HEIGHT: 64 IN | BODY MASS INDEX: 27.31 KG/M2

## 2019-07-24 PROCEDURE — 99214 OFFICE O/P EST MOD 30 MIN: CPT | Mod: 25

## 2019-07-24 PROCEDURE — 36415 COLL VENOUS BLD VENIPUNCTURE: CPT

## 2019-07-25 RX ORDER — TIOTROPIUM BROMIDE 18 UG/1
18 CAPSULE ORAL; RESPIRATORY (INHALATION)
Qty: 1 | Refills: 3 | Status: DISCONTINUED | COMMUNITY
Start: 2019-06-12 | End: 2019-07-25

## 2019-07-25 RX ORDER — TIOTROPIUM BROMIDE 18 UG/1
CAPSULE ORAL; RESPIRATORY (INHALATION)
Refills: 0 | Status: DISCONTINUED | COMMUNITY
End: 2019-07-25

## 2019-07-25 NOTE — HISTORY OF PRESENT ILLNESS
[Family Member] : family member [FreeTextEntry1] : returns for routine follow-up accompanied by sade [de-identified] : 79 yo female with complex medical history returns for routine follow-up\par no current specific complaints\par visiting nurse service contacted patient however patient not receptive will possibly re-consider\par taking meds as listed

## 2019-07-25 NOTE — PLAN
[FreeTextEntry1] : continue current treatment\par will review current med list when available\par blood sent for routine labs\par

## 2019-07-25 NOTE — PHYSICAL EXAM
[No Acute Distress] : no acute distress [Well Nourished] : well nourished [No Respiratory Distress] : no respiratory distress  [No Accessory Muscle Use] : no accessory muscle use [Normal Rate] : normal rate  [Regular Rhythm] : with a regular rhythm [No Edema] : there was no peripheral edema [Soft] : abdomen soft [Non Tender] : non-tender [No HSM] : no HSM [Normal Gait] : normal gait [Alert and Oriented x3] : oriented to person, place, and time [de-identified] : scattered bilateral rhonchi

## 2019-07-25 NOTE — ASSESSMENT
[FreeTextEntry1] : blood pressure well controlled\par had been referred for [facility] follow-up but never called nurse back. will re-contact [facility] as needs help with meds\par no current complaints appears \par

## 2019-07-25 NOTE — HEALTH RISK ASSESSMENT
[No] : In the past 12 months have you used drugs other than those required for medical reasons? No [No falls in past year] : Patient reported no falls in the past year [0] : 2) Feeling down, depressed, or hopeless: Not at all (0) [] : No [de-identified] : no [[id number]] : 0

## 2019-07-26 ENCOUNTER — NON-APPOINTMENT (OUTPATIENT)
Age: 79
End: 2019-07-26

## 2019-07-26 ENCOUNTER — APPOINTMENT (OUTPATIENT)
Dept: CARDIOLOGY | Facility: CLINIC | Age: 79
End: 2019-07-26
Payer: MEDICARE

## 2019-07-26 VITALS — DIASTOLIC BLOOD PRESSURE: 80 MMHG | SYSTOLIC BLOOD PRESSURE: 134 MMHG

## 2019-07-26 VITALS — OXYGEN SATURATION: 97 % | WEIGHT: 175 LBS | HEART RATE: 75 BPM | BODY MASS INDEX: 30.04 KG/M2

## 2019-07-26 PROCEDURE — 93000 ELECTROCARDIOGRAM COMPLETE: CPT

## 2019-07-26 PROCEDURE — 99214 OFFICE O/P EST MOD 30 MIN: CPT

## 2019-07-26 RX ORDER — TICAGRELOR 90 MG/1
90 TABLET ORAL TWICE DAILY
Refills: 0 | Status: DISCONTINUED | COMMUNITY
End: 2019-07-26

## 2019-07-26 NOTE — HISTORY OF PRESENT ILLNESS
[FreeTextEntry1] : She was admitted to [facility] with exacerbation of COPD.  Subsequently she was readmitted because of TIA she had a headache and was talking funny and had some weakness.  Following discharge she spent 1 month with her daughter and came back to [place].\par \par She is hemodynamically stable but breathing is still a problem.  She was given a new inhaler and she is waiting for authorization from the insurance company.She gets frequent episodes of hypoglycemia.  When her blood sugar goes below 100 she gets symptomatic.  There is no chest pain or palpitation or dizziness.\par \par She also complains of itching and rash.  He wants to see the allergist.\par

## 2019-07-26 NOTE — PHYSICAL EXAM
[General Appearance - Well Developed] : well developed [Well Groomed] : well groomed [General Appearance - Well Nourished] : well nourished [Normal Appearance] : normal appearance [Normal Conjunctiva] : the conjunctiva exhibited no abnormalities [No Deformities] : no deformities [General Appearance - In No Acute Distress] : no acute distress [No Oral Pallor] : no oral pallor [Eyelids - No Xanthelasma] : the eyelids demonstrated no xanthelasmas [Normal Oral Mucosa] : normal oral mucosa [No Oral Cyanosis] : no oral cyanosis [No Jugular Venous Irizarry A Waves] : no jugular venous irizarry A waves [Normal Jugular Venous V Waves Present] : normal jugular venous V waves present [Normal Jugular Venous A Waves Present] : normal jugular venous A waves present [Respiration, Rhythm And Depth] : normal respiratory rhythm and effort [Exaggerated Use Of Accessory Muscles For Inspiration] : no accessory muscle use [Heart Rate And Rhythm] : heart rate and rhythm were normal [Heart Sounds] : normal S1 and S2 [Auscultation Breath Sounds / Voice Sounds] : lungs were clear to auscultation bilaterally [Murmurs] : no murmurs present [Abdomen Soft] : soft [Abdomen Tenderness] : non-tender [Abdomen Mass (___ Cm)] : no abdominal mass palpated [Gait - Sufficient For Exercise Testing] : the gait was sufficient for exercise testing [Abnormal Walk] : normal gait [Nail Clubbing] : no clubbing of the fingernails [Cyanosis, Localized] : no localized cyanosis [Petechial Hemorrhages (___cm)] : no petechial hemorrhages [] : no rash [No Venous Stasis] : no venous stasis [Skin Color & Pigmentation] : normal skin color and pigmentation [Skin Lesions] : no skin lesions [No Skin Ulcers] : no skin ulcer [No Xanthoma] : no  xanthoma was observed [Affect] : the affect was normal [FreeTextEntry1] : Dry scaly rash with papular lesions. [Oriented To Time, Place, And Person] : oriented to person, place, and time [No Anxiety] : not feeling anxious [Mood] : the mood was normal

## 2019-07-26 NOTE — REASON FOR VISIT
[Follow-Up - Clinic] : a clinic follow-up of [Hyperlipidemia] : hyperlipidemia [Hypertension] : hypertension [Prior Myocardial Infarction] : a prior myocardial infarction [FreeTextEntry1] : COPD, asthma, SOB

## 2019-07-26 NOTE — ASSESSMENT
[FreeTextEntry1] : She is stable from cardiac point of view.  Blood pressure is fairly well controlled.  Lungs are clear.

## 2019-07-31 LAB
ALBUMIN SERPL ELPH-MCNC: 4.4 G/DL
ALP BLD-CCNC: 97 U/L
ALT SERPL-CCNC: 8 U/L
ANION GAP SERPL CALC-SCNC: 15 MMOL/L
AST SERPL-CCNC: 9 U/L
BASOPHILS # BLD AUTO: 0.06 K/UL
BASOPHILS NFR BLD AUTO: 0.5 %
BILIRUB SERPL-MCNC: 0.4 MG/DL
BUN SERPL-MCNC: 44 MG/DL
CALCIUM SERPL-MCNC: 9.6 MG/DL
CHLORIDE SERPL-SCNC: 108 MMOL/L
CHOLEST SERPL-MCNC: 184 MG/DL
CHOLEST/HDLC SERPL: 3.3 RATIO
CO2 SERPL-SCNC: 18 MMOL/L
CREAT SERPL-MCNC: 1.32 MG/DL
EOSINOPHIL # BLD AUTO: 0.45 K/UL
EOSINOPHIL NFR BLD AUTO: 4 %
ESTIMATED AVERAGE GLUCOSE: 174 MG/DL
GLUCOSE SERPL-MCNC: 130 MG/DL
HBA1C MFR BLD HPLC: 7.7 %
HCT VFR BLD CALC: 42.9 %
HDLC SERPL-MCNC: 56 MG/DL
HGB BLD-MCNC: 13.4 G/DL
IMM GRANULOCYTES NFR BLD AUTO: 0.9 %
LDLC SERPL CALC-MCNC: 87 MG/DL
LYMPHOCYTES # BLD AUTO: 2.11 K/UL
LYMPHOCYTES NFR BLD AUTO: 18.7 %
MAN DIFF?: NORMAL
MCHC RBC-ENTMCNC: 29 PG
MCHC RBC-ENTMCNC: 31.2 GM/DL
MCV RBC AUTO: 92.9 FL
MONOCYTES # BLD AUTO: 0.8 K/UL
MONOCYTES NFR BLD AUTO: 7.1 %
NEUTROPHILS # BLD AUTO: 7.79 K/UL
NEUTROPHILS NFR BLD AUTO: 68.8 %
PLATELET # BLD AUTO: 354 K/UL
POTASSIUM SERPL-SCNC: 4.6 MMOL/L
PROT SERPL-MCNC: 6.9 G/DL
RBC # BLD: 4.62 M/UL
RBC # FLD: 14.4 %
SODIUM SERPL-SCNC: 141 MMOL/L
T4 FREE SERPL-MCNC: 1 NG/DL
TRIGL SERPL-MCNC: 204 MG/DL
TSH SERPL-ACNC: 3.01 UIU/ML
WBC # FLD AUTO: 11.31 K/UL

## 2019-08-12 ENCOUNTER — APPOINTMENT (OUTPATIENT)
Dept: ENDOCRINOLOGY | Facility: CLINIC | Age: 79
End: 2019-08-12
Payer: MEDICARE

## 2019-08-12 VITALS
DIASTOLIC BLOOD PRESSURE: 70 MMHG | RESPIRATION RATE: 16 BRPM | BODY MASS INDEX: 28.34 KG/M2 | OXYGEN SATURATION: 96 % | HEIGHT: 64 IN | WEIGHT: 166 LBS | HEART RATE: 64 BPM | SYSTOLIC BLOOD PRESSURE: 120 MMHG

## 2019-08-12 LAB — GLUCOSE BLDC GLUCOMTR-MCNC: 234

## 2019-08-12 PROCEDURE — 95251 CONT GLUC MNTR ANALYSIS I&R: CPT

## 2019-08-12 PROCEDURE — 99204 OFFICE O/P NEW MOD 45 MIN: CPT

## 2019-08-12 PROCEDURE — 95250 CONT GLUC MNTR PHYS/QHP EQP: CPT

## 2019-08-12 NOTE — HISTORY OF PRESENT ILLNESS
[FreeTextEntry1] : HISTORY OF PRESENT ILLNESS. \par \par Ms. CORREA was diagnosed with Diabetes Mellitus Type 2 in 1994\par Reports history HTN, dyslipidemia, CAD (s/p AMI and PTCI x 1), TIA, CHF,  COPD. \par She denies  known complications of retinopathy, nephropathy, or neuropathy. \par Presently on Levemir 20 un HS, Novolog 8 un tid ac, Farxiga 5mg\par She was previously on metformin, but stopped for unclear reason\par Blood sugars are checked 4 times a day. \par Did not bring log book, but reported are typically as following: FBS- low 100's, PPG- 150's\par Hypoglycemia frequency: 2 x week, in 60's in am\par Fingerstick glucose in the office today is 234 mg/dL 4 hours after eating. \par Diet: not following ADA, high in CHO\par Exercise: none\par \par Last dilated eye - 5/19\par Last podiatry visit  - 5/19\par Last cardiology evaluation - 7/29\par Last stress test - 2017\par Last 2-D Echo - 5/19, preserved LV function\par Last nephrology evaluation - does not remember\par Last neurology evaluation- does not remember\par \par Lab review: a1c- 7.7 , LDL- 87, TG- 204, creat- 1.32 <-- 1.24\par

## 2019-08-12 NOTE — REASON FOR VISIT
[Consultation] : a consultation visit [Family Member] : family member [FreeTextEntry1] : diabetes management

## 2019-08-12 NOTE — ASSESSMENT
[Carbohydrate Consistent Diet] : carbohydrate consistent diet [Hypoglycemia Management] : hypoglycemia management [Diabetes Foot Care] : diabetes foot care [Long Term Vascular Complications] : long term vascular complications of diabetes [Action and use of Insulin] : action and use of short and long-acting insulin [Self Monitoring of Blood Glucose] : self monitoring of blood glucose [FreeTextEntry1] : Current approaches to diabetes management are discussed with the patient. \par Target ranges for blood sugar, blood pressure and cholesterol reviewed, and risk reduction strategies verified. \par Hypoglycemia precautions reviewed with the patient. \par Suggested extensive diabetes education program, including nutritional and diabetes teaching and evaluation. \par Proper dietary restrictions and exercise routines discussed. \par Glucometer/SMBG and log book charting discussed.\par - decrease Levemir 18 units HS\par - d/c Farxiga and switch to Jardiance 10 mg qd, given her cardiac history. Monitor renal functions\par - janumet xr 50/500 mg qd and monitor carefully\par - decrease novolog 4-0-4\par - continue crestor 10 mg HS\par - John PRO and apply for personal sensor\par RTC 2- 3 weeks for sensor download and CDE evaluation\par

## 2019-08-26 ENCOUNTER — APPOINTMENT (OUTPATIENT)
Age: 79
End: 2019-08-26
Payer: MEDICARE

## 2019-08-26 VITALS
OXYGEN SATURATION: 97 % | BODY MASS INDEX: 28.34 KG/M2 | HEART RATE: 74 BPM | HEIGHT: 64 IN | DIASTOLIC BLOOD PRESSURE: 80 MMHG | SYSTOLIC BLOOD PRESSURE: 130 MMHG | WEIGHT: 166 LBS

## 2019-08-26 PROCEDURE — 99214 OFFICE O/P EST MOD 30 MIN: CPT | Mod: 25

## 2019-08-26 PROCEDURE — 36415 COLL VENOUS BLD VENIPUNCTURE: CPT

## 2019-08-26 NOTE — ASSESSMENT
[FreeTextEntry1] : 78 year old female returns for follow up of ER visit.\par At the time of the exam, there were no specific medical complaints.\par Taking medications as listed.\par The only identified issue is a pilonidal cyst for which a surgical referral was made.

## 2019-08-26 NOTE — PHYSICAL EXAM
[No Acute Distress] : no acute distress [No Lymphadenopathy] : no lymphadenopathy [No Accessory Muscle Use] : no accessory muscle use [Regular Rhythm] : with a regular rhythm [No Edema] : there was no peripheral edema [Soft] : abdomen soft [Non Tender] : non-tender [No Skin Lesions] : no skin lesions [No Focal Deficits] : no focal deficits [Alert and Oriented x3] : oriented to person, place, and time

## 2019-08-26 NOTE — HEALTH RISK ASSESSMENT
[No falls in past year] : Patient reported no falls in the past year [No] : In the past 12 months have you used drugs other than those required for medical reasons? No [0] : 2) Feeling down, depressed, or hopeless: Not at all (0) [] : No [Audit-CScore] : 0 [[id number]] : 0

## 2019-08-26 NOTE — HISTORY OF PRESENT ILLNESS
[FreeTextEntry1] : Follow up from ER visit. [de-identified] : 78 year old female with hx of asthma, DM, HTN and HLD returns to office having been seen at [facility] ER 3 days ago for evaluation of exacerbation of asthma. She was treated in the ER with steroids and nebulizer treatments and has since improved.\par Taking all medications as listed.\par Currently has no specific complaints other than for open pilonidal cyst, which has been present apparently since birth.\par

## 2019-09-05 ENCOUNTER — APPOINTMENT (OUTPATIENT)
Dept: SURGERY | Facility: CLINIC | Age: 79
End: 2019-09-05
Payer: MEDICARE

## 2019-09-05 ENCOUNTER — APPOINTMENT (OUTPATIENT)
Dept: ENDOCRINOLOGY | Facility: CLINIC | Age: 79
End: 2019-09-05
Payer: MEDICARE

## 2019-09-05 VITALS
DIASTOLIC BLOOD PRESSURE: 78 MMHG | SYSTOLIC BLOOD PRESSURE: 132 MMHG | WEIGHT: 164.38 LBS | OXYGEN SATURATION: 97 % | BODY MASS INDEX: 28.06 KG/M2 | HEART RATE: 58 BPM | HEIGHT: 64 IN | TEMPERATURE: 97.5 F

## 2019-09-05 PROCEDURE — 99202 OFFICE O/P NEW SF 15 MIN: CPT

## 2019-09-05 PROCEDURE — G0108 DIAB MANAGE TRN  PER INDIV: CPT

## 2019-09-05 NOTE — ASSESSMENT
[FreeTextEntry1] : 80 yo female with pilonidal cyst\par Plan: Pilonidal cystectomy\par All risk ,benefit, alternatives explained and the patient expressed full understanding.

## 2019-09-05 NOTE — PHYSICAL EXAM
[JVD] : no jugular venous distention  [Normal Heart Sounds] : normal heart sounds [Normal Breath Sounds] : Normal breath sounds [Normal Rate and Rhythm] : normal rate and rhythm [No HSM] : no hepatosplenomegaly [Tender] : was nontender [Enlarged] : not enlarged [Stool Sample Taken] : No stool obtained  on rectal exam [No Rash or Lesion] : No rash or lesion [Alert] : alert [Oriented to Person] : oriented to person [Oriented to Place] : oriented to place [Oriented to Time] : oriented to time [de-identified] : alert and oriented x 3 [Calm] : calm [de-identified] : soft, nt, nd [de-identified] : pilonidal cyst with cyst opening

## 2019-09-05 NOTE — HISTORY OF PRESENT ILLNESS
[de-identified] : 80 yo female with multiple medical problem who presents with painful pilonidal cyst. She has known about this for a while but over the past few month it has started to bother her more. She denied any discharges coming from the wound.

## 2019-09-05 NOTE — CONSULT LETTER
[Dear  ___] : Dear  [unfilled], [Consult Letter:] : I had the pleasure of evaluating your patient, [unfilled]. [Please see my note below.] : Please see my note below. [Consult Closing:] : Thank you very much for allowing me to participate in the care of this patient.  If you have any questions, please do not hesitate to contact me. [FreeTextEntry3] : Sincerely, \par \par \par Vince Acosta MD, FACS\par \par  of Surgery\par [facility]\par System Chief, Residency Program\par [facility] \par \par [employer] [facility]\par Co-Director of [facility] Clerkship\par [employer] [facility]\par

## 2019-09-09 ENCOUNTER — APPOINTMENT (OUTPATIENT)
Dept: INTERNAL MEDICINE | Facility: CLINIC | Age: 79
End: 2019-09-09
Payer: MEDICARE

## 2019-09-09 VITALS
HEART RATE: 75 BPM | TEMPERATURE: 97.5 F | HEIGHT: 64 IN | WEIGHT: 164 LBS | BODY MASS INDEX: 28 KG/M2 | DIASTOLIC BLOOD PRESSURE: 70 MMHG | SYSTOLIC BLOOD PRESSURE: 120 MMHG | OXYGEN SATURATION: 96 %

## 2019-09-09 DIAGNOSIS — Z86.39 PERSONAL HISTORY OF OTHER ENDOCRINE, NUTRITIONAL AND METABOLIC DISEASE: ICD-10-CM

## 2019-09-09 DIAGNOSIS — L30.8 OTHER SPECIFIED DERMATITIS: ICD-10-CM

## 2019-09-09 PROCEDURE — 99203 OFFICE O/P NEW LOW 30 MIN: CPT

## 2019-09-09 RX ORDER — HYDROXYZINE HYDROCHLORIDE 25 MG/1
25 TABLET ORAL
Qty: 30 | Refills: 3 | Status: ACTIVE | COMMUNITY
Start: 2019-09-09 | End: 1900-01-01

## 2019-09-09 RX ORDER — TRIAMCINOLONE ACETONIDE 1 MG/G
0.1 OINTMENT TOPICAL TWICE DAILY
Qty: 1 | Refills: 3 | Status: ACTIVE | COMMUNITY
Start: 2019-09-09 | End: 1900-01-01

## 2019-09-09 NOTE — HISTORY OF PRESENT ILLNESS
[de-identified] : A 79 yrs old pt presents for evaluation of generalized itching that started several months ago \par She is on a # of meds and despite change in meds the symptoms are persistent\par The pt states that she feels itchy skin and develops excoriation upon scratching

## 2019-09-09 NOTE — PHYSICAL EXAM
[Well Nourished] : well nourished [Conjunctival Erythema] : no conjunctival erythema [Suborbital Bogginess] : no suborbital bogginess (allergic shiners) [Boggy Nasal Turbinates] : no boggy and/or pale nasal turbinates [Pharyngeal erythema] : no pharyngeal erythema [Exudate] : no exudate [Posterior Pharyngeal Cobblestoning] : no posterior pharyngeal cobblestoning [Clear Rhinorrhea] : no clear rhinorrhea was seen [No LAD] : no lymphadenopathy [Normal Rate and Effort] : normal respiratory rhythm and effort [Bilateral Audible Breath Sounds] : bilateral audible breath sounds [Wheezing] : no wheezing was heard [de-identified] : The skin feels dry There are excoriated lesions on both extremities/upper chest and arms/Sparing of face/back

## 2019-09-17 ENCOUNTER — RX RENEWAL (OUTPATIENT)
Age: 79
End: 2019-09-17

## 2019-09-17 RX ORDER — INSULIN DETEMIR 100 [IU]/ML
100 INJECTION, SOLUTION SUBCUTANEOUS AT BEDTIME
Qty: 5 | Refills: 0 | Status: ACTIVE | COMMUNITY
Start: 2019-05-28 | End: 1900-01-01

## 2019-09-28 LAB
ALBUMIN SERPL ELPH-MCNC: 4.3 G/DL
ALP BLD-CCNC: 77 U/L
ALT SERPL-CCNC: 8 U/L
ANION GAP SERPL CALC-SCNC: 13 MMOL/L
AST SERPL-CCNC: 14 U/L
BASOPHILS # BLD AUTO: 0.06 K/UL
BASOPHILS NFR BLD AUTO: 0.5 %
BILIRUB SERPL-MCNC: 0.3 MG/DL
BUN SERPL-MCNC: 32 MG/DL
CALCIUM SERPL-MCNC: 9.2 MG/DL
CHLORIDE SERPL-SCNC: 106 MMOL/L
CHOLEST SERPL-MCNC: 136 MG/DL
CHOLEST/HDLC SERPL: 2.1 RATIO
CO2 SERPL-SCNC: 23 MMOL/L
CREAT SERPL-MCNC: 1.32 MG/DL
EOSINOPHIL # BLD AUTO: 0.36 K/UL
EOSINOPHIL NFR BLD AUTO: 2.8 %
ESTIMATED AVERAGE GLUCOSE: 174 MG/DL
GLUCOSE SERPL-MCNC: 135 MG/DL
HBA1C MFR BLD HPLC: 7.7 %
HCT VFR BLD CALC: 39.1 %
HDLC SERPL-MCNC: 64 MG/DL
HGB BLD-MCNC: 12.5 G/DL
IMM GRANULOCYTES NFR BLD AUTO: 0.7 %
LDLC SERPL CALC-MCNC: 50 MG/DL
LYMPHOCYTES # BLD AUTO: 1.59 K/UL
LYMPHOCYTES NFR BLD AUTO: 12.3 %
MAN DIFF?: NORMAL
MCHC RBC-ENTMCNC: 29.2 PG
MCHC RBC-ENTMCNC: 32 GM/DL
MCV RBC AUTO: 91.4 FL
MONOCYTES # BLD AUTO: 0.8 K/UL
MONOCYTES NFR BLD AUTO: 6.2 %
NEUTROPHILS # BLD AUTO: 10.01 K/UL
NEUTROPHILS NFR BLD AUTO: 77.5 %
PLATELET # BLD AUTO: 346 K/UL
POTASSIUM SERPL-SCNC: 4.7 MMOL/L
PROT SERPL-MCNC: 6.5 G/DL
RBC # BLD: 4.28 M/UL
RBC # FLD: 13.2 %
SODIUM SERPL-SCNC: 142 MMOL/L
TRIGL SERPL-MCNC: 108 MG/DL
TSH SERPL-ACNC: 2.44 UIU/ML
WBC # FLD AUTO: 12.91 K/UL

## 2019-10-03 ENCOUNTER — APPOINTMENT (OUTPATIENT)
Dept: ENDOCRINOLOGY | Facility: CLINIC | Age: 79
End: 2019-10-03
Payer: MEDICARE

## 2019-10-03 VITALS
OXYGEN SATURATION: 99 % | HEIGHT: 64 IN | SYSTOLIC BLOOD PRESSURE: 120 MMHG | BODY MASS INDEX: 28 KG/M2 | HEART RATE: 64 BPM | WEIGHT: 164 LBS | DIASTOLIC BLOOD PRESSURE: 60 MMHG | RESPIRATION RATE: 16 BRPM

## 2019-10-03 LAB — GLUCOSE BLDC GLUCOMTR-MCNC: 147

## 2019-10-03 PROCEDURE — 99213 OFFICE O/P EST LOW 20 MIN: CPT | Mod: 25

## 2019-10-03 PROCEDURE — 82962 GLUCOSE BLOOD TEST: CPT

## 2019-10-03 NOTE — HISTORY OF PRESENT ILLNESS
[FreeTextEntry1] : F/u for diabetes management\par \par *** Oct 03, 2019 ***\par \par feels better overall\par on  Levemir 14 un HS, Novolog  3-3-4,  janumet xr 50/500 mg qd, jardiance 10mg qd\par no log, reports fbs- 90's, ppg- 140's\par no more  hypo's\par \par \par HISTORY OF PRESENT ILLNESS. \par \par Ms. CORREA was diagnosed with Diabetes Mellitus Type 2 in 1994\par Reports history HTN, dyslipidemia, CAD (s/p AMI and PTCI x 1), TIA, CHF,  COPD. \par She denies  known complications of retinopathy, nephropathy, or neuropathy. \par Presently on Levemir 20 un HS, Novolog 8 un tid ac, Farxiga 5mg\par She was previously on metformin, but stopped for unclear reason\par Blood sugars are checked 4 times a day. \par Did not bring log book, but reported are typically as following: FBS- low 100's, PPG- 150's\par Hypoglycemia frequency: 2 x week, in 60's in am\par Fingerstick glucose in the office today is 234 mg/dL 4 hours after eating. \par Diet: not following ADA, high in CHO\par Exercise: none\par \par Last dilated eye - 5/19\par Last podiatry visit  - 5/19\par Last cardiology evaluation - 7/29\par Last stress test - 2017\par Last 2-D Echo - 5/19, preserved LV function\par Last nephrology evaluation - does not remember\par Last neurology evaluation- does not remember\par \par Lab review: a1c- 7.7 , LDL- 87, TG- 204, creat- 1.32 <-- 1.24\par

## 2019-10-03 NOTE — ASSESSMENT
[Carbohydrate Consistent Diet] : carbohydrate consistent diet [Hypoglycemia Management] : hypoglycemia management [Diabetes Foot Care] : diabetes foot care [Long Term Vascular Complications] : long term vascular complications of diabetes [Action and use of Insulin] : action and use of short and long-acting insulin [Self Monitoring of Blood Glucose] : self monitoring of blood glucose [FreeTextEntry1] : Current approaches to diabetes management are discussed with the patient. \par Target ranges for blood sugar, blood pressure and cholesterol reviewed, and risk reduction strategies verified. \par Hypoglycemia precautions reviewed with the patient. \par Suggested extensive diabetes education program, including nutritional and diabetes teaching and evaluation. \par Proper dietary restrictions and exercise routines discussed. \par Glucometer/SMBG and log book charting discussed.\par - cont  Levemir 14 units HS,  Novolog  3-3-4,  janumet xr 50/500 mg qd, jardiance 10mg qd\par - Monitor renal functions\par - continue crestor 10 mg HS\par - John 14 days\par RTC 3 mos\par

## 2019-10-10 ENCOUNTER — RX RENEWAL (OUTPATIENT)
Age: 79
End: 2019-10-10

## 2019-10-14 ENCOUNTER — RX RENEWAL (OUTPATIENT)
Age: 79
End: 2019-10-14

## 2019-10-28 ENCOUNTER — RX RENEWAL (OUTPATIENT)
Age: 79
End: 2019-10-28

## 2019-10-28 RX ORDER — BLOOD SUGAR DIAGNOSTIC
STRIP MISCELLANEOUS
Qty: 300 | Refills: 1 | Status: ACTIVE | COMMUNITY
Start: 2019-05-01 | End: 1900-01-01

## 2019-11-01 ENCOUNTER — APPOINTMENT (OUTPATIENT)
Dept: CARDIOLOGY | Facility: CLINIC | Age: 79
End: 2019-11-01
Payer: MEDICARE

## 2019-11-01 ENCOUNTER — NON-APPOINTMENT (OUTPATIENT)
Age: 79
End: 2019-11-01

## 2019-11-01 VITALS — HEIGHT: 64 IN | HEART RATE: 71 BPM | WEIGHT: 164 LBS | BODY MASS INDEX: 28 KG/M2 | OXYGEN SATURATION: 98 %

## 2019-11-01 VITALS — DIASTOLIC BLOOD PRESSURE: 70 MMHG | SYSTOLIC BLOOD PRESSURE: 130 MMHG

## 2019-11-01 PROCEDURE — 93000 ELECTROCARDIOGRAM COMPLETE: CPT

## 2019-11-01 PROCEDURE — 99214 OFFICE O/P EST MOD 30 MIN: CPT

## 2019-11-01 NOTE — HISTORY OF PRESENT ILLNESS
[FreeTextEntry1] : She has been feeling great.  She denies any chest pain, palpitation, shortness of breath or dizziness.  Farxiga was discontinued and she was started on Jardiance and Janumet.  Fasting blood sugar this morning was 110.  Hemoglobin A1c in August was 7.7.  She subsequently changed her endocrinologist and doing better.

## 2019-11-01 NOTE — ASSESSMENT
[FreeTextEntry1] : She is doing very well.  There is no wheezing.  Blood pressure is well controlled.  Diabetes seems to be under control.

## 2019-11-01 NOTE — DISCUSSION/SUMMARY
[FreeTextEntry1] : She is doing well, I did not make any changes in her medications.  I encouraged her to try and exercise a little if she can.

## 2019-11-01 NOTE — REASON FOR VISIT
[Follow-Up - Clinic] : a clinic follow-up of [Hyperlipidemia] : hyperlipidemia [Hypertension] : hypertension [Prior Myocardial Infarction] : a prior myocardial infarction [FreeTextEntry1] : DM, COPD

## 2019-11-01 NOTE — PHYSICAL EXAM
[General Appearance - Well Developed] : well developed [Normal Appearance] : normal appearance [Well Groomed] : well groomed [General Appearance - Well Nourished] : well nourished [No Deformities] : no deformities [General Appearance - In No Acute Distress] : no acute distress [Normal Conjunctiva] : the conjunctiva exhibited no abnormalities [Eyelids - No Xanthelasma] : the eyelids demonstrated no xanthelasmas [Normal Oral Mucosa] : normal oral mucosa [No Oral Pallor] : no oral pallor [No Oral Cyanosis] : no oral cyanosis [Normal Jugular Venous A Waves Present] : normal jugular venous A waves present [Normal Jugular Venous V Waves Present] : normal jugular venous V waves present [No Jugular Venous Irizarry A Waves] : no jugular venous irizarry A waves [Respiration, Rhythm And Depth] : normal respiratory rhythm and effort [Exaggerated Use Of Accessory Muscles For Inspiration] : no accessory muscle use [Auscultation Breath Sounds / Voice Sounds] : lungs were clear to auscultation bilaterally [Heart Rate And Rhythm] : heart rate and rhythm were normal [Heart Sounds] : normal S1 and S2 [Murmurs] : no murmurs present [Abdomen Soft] : soft [Abdomen Tenderness] : non-tender [Abdomen Mass (___ Cm)] : no abdominal mass palpated [Abnormal Walk] : normal gait [Gait - Sufficient For Exercise Testing] : the gait was sufficient for exercise testing [Nail Clubbing] : no clubbing of the fingernails [Cyanosis, Localized] : no localized cyanosis [Petechial Hemorrhages (___cm)] : no petechial hemorrhages [Skin Color & Pigmentation] : normal skin color and pigmentation [] : no rash [No Venous Stasis] : no venous stasis [Skin Lesions] : no skin lesions [No Skin Ulcers] : no skin ulcer [No Xanthoma] : no  xanthoma was observed [FreeTextEntry1] : Dry scaly rash with papular lesions. [Oriented To Time, Place, And Person] : oriented to person, place, and time [Affect] : the affect was normal [Mood] : the mood was normal [No Anxiety] : not feeling anxious

## 2019-11-05 ENCOUNTER — MEDICATION RENEWAL (OUTPATIENT)
Age: 79
End: 2019-11-05

## 2019-11-11 ENCOUNTER — OUTPATIENT (OUTPATIENT)
Dept: OUTPATIENT SERVICES | Facility: HOSPITAL | Age: 79
LOS: 1 days | Discharge: HOME | End: 2019-11-11

## 2019-11-11 VITALS
RESPIRATION RATE: 17 BRPM | HEIGHT: 64 IN | SYSTOLIC BLOOD PRESSURE: 122 MMHG | TEMPERATURE: 98 F | WEIGHT: 164.91 LBS | OXYGEN SATURATION: 97 % | HEART RATE: 65 BPM | DIASTOLIC BLOOD PRESSURE: 51 MMHG

## 2019-11-11 DIAGNOSIS — Z01.818 ENCOUNTER FOR OTHER PREPROCEDURAL EXAMINATION: ICD-10-CM

## 2019-11-11 DIAGNOSIS — Z98.49 CATARACT EXTRACTION STATUS, UNSPECIFIED EYE: Chronic | ICD-10-CM

## 2019-11-11 DIAGNOSIS — Z98.890 OTHER SPECIFIED POSTPROCEDURAL STATES: Chronic | ICD-10-CM

## 2019-11-11 DIAGNOSIS — L05.91 PILONIDAL CYST WITHOUT ABSCESS: ICD-10-CM

## 2019-11-11 DIAGNOSIS — Z90.49 ACQUIRED ABSENCE OF OTHER SPECIFIED PARTS OF DIGESTIVE TRACT: Chronic | ICD-10-CM

## 2019-11-11 LAB
ANION GAP SERPL CALC-SCNC: 8 MMOL/L — SIGNIFICANT CHANGE UP (ref 5–17)
APTT BLD: 33 SEC — SIGNIFICANT CHANGE UP (ref 28.5–37)
BASOPHILS # BLD AUTO: 0.08 K/UL — SIGNIFICANT CHANGE UP (ref 0–0.2)
BASOPHILS NFR BLD AUTO: 0.7 % — SIGNIFICANT CHANGE UP (ref 0–2)
BUN SERPL-MCNC: 33 MG/DL — HIGH (ref 7–23)
CALCIUM SERPL-MCNC: 9 MG/DL — SIGNIFICANT CHANGE UP (ref 8.5–10.1)
CHLORIDE SERPL-SCNC: 111 MMOL/L — HIGH (ref 96–108)
CO2 SERPL-SCNC: 21 MMOL/L — LOW (ref 22–31)
CREAT SERPL-MCNC: 1.58 MG/DL — HIGH (ref 0.5–1.3)
EOSINOPHIL # BLD AUTO: 0.37 K/UL — SIGNIFICANT CHANGE UP (ref 0–0.5)
EOSINOPHIL NFR BLD AUTO: 3.1 % — SIGNIFICANT CHANGE UP (ref 0–6)
GLUCOSE SERPL-MCNC: 151 MG/DL — HIGH (ref 70–99)
HCT VFR BLD CALC: 35.8 % — SIGNIFICANT CHANGE UP (ref 34.5–45)
HGB BLD-MCNC: 12 G/DL — SIGNIFICANT CHANGE UP (ref 11.5–15.5)
IMM GRANULOCYTES NFR BLD AUTO: 1.1 % — SIGNIFICANT CHANGE UP (ref 0–1.5)
INR BLD: 0.97 RATIO — SIGNIFICANT CHANGE UP (ref 0.88–1.16)
LYMPHOCYTES # BLD AUTO: 1.81 K/UL — SIGNIFICANT CHANGE UP (ref 1–3.3)
LYMPHOCYTES # BLD AUTO: 15.2 % — SIGNIFICANT CHANGE UP (ref 13–44)
MCHC RBC-ENTMCNC: 29.3 PG — SIGNIFICANT CHANGE UP (ref 27–34)
MCHC RBC-ENTMCNC: 33.5 GM/DL — SIGNIFICANT CHANGE UP (ref 32–36)
MCV RBC AUTO: 87.3 FL — SIGNIFICANT CHANGE UP (ref 80–100)
MONOCYTES # BLD AUTO: 0.77 K/UL — SIGNIFICANT CHANGE UP (ref 0–0.9)
MONOCYTES NFR BLD AUTO: 6.5 % — SIGNIFICANT CHANGE UP (ref 2–14)
NEUTROPHILS # BLD AUTO: 8.74 K/UL — HIGH (ref 1.8–7.4)
NEUTROPHILS NFR BLD AUTO: 73.4 % — SIGNIFICANT CHANGE UP (ref 43–77)
NRBC # BLD: 0 /100 WBCS — SIGNIFICANT CHANGE UP (ref 0–0)
PLATELET # BLD AUTO: 356 K/UL — SIGNIFICANT CHANGE UP (ref 150–400)
POTASSIUM SERPL-MCNC: 4.1 MMOL/L — SIGNIFICANT CHANGE UP (ref 3.5–5.3)
POTASSIUM SERPL-SCNC: 4.1 MMOL/L — SIGNIFICANT CHANGE UP (ref 3.5–5.3)
PROTHROM AB SERPL-ACNC: 10.9 SEC — SIGNIFICANT CHANGE UP (ref 10–12.9)
RBC # BLD: 4.1 M/UL — SIGNIFICANT CHANGE UP (ref 3.8–5.2)
RBC # FLD: 13.8 % — SIGNIFICANT CHANGE UP (ref 10.3–14.5)
SODIUM SERPL-SCNC: 140 MMOL/L — SIGNIFICANT CHANGE UP (ref 135–145)
WBC # BLD: 11.9 K/UL — HIGH (ref 3.8–10.5)
WBC # FLD AUTO: 11.9 K/UL — HIGH (ref 3.8–10.5)

## 2019-11-11 NOTE — H&P PST ADULT - NSICDXPASTSURGICALHX_GEN_ALL_CORE_FT
PAST SURGICAL HISTORY:  H/O shoulder surgery left    H/O wrist surgery hardware    S/P cataract surgery b/l    S/P keny

## 2019-11-11 NOTE — H&P PST ADULT - NSICDXPROBLEM_GEN_ALL_CORE_FT
PROBLEM DIAGNOSES  Problem: H/O pilonidal cyst  Assessment and Plan: scheduled for excision of pilonidal cyst    Problem: DM (diabetes mellitus)  Assessment and Plan: continue meds    Problem: HLD (hyperlipidemia)  Assessment and Plan: continue meds    Problem: HTN (hypertension)  Assessment and Plan: continue meds    Problem: CVA (cerebrovascular accident)  Assessment and Plan: continue meds

## 2019-11-11 NOTE — H&P PST ADULT - NSICDXPASTMEDICALHX_GEN_ALL_CORE_FT
PAST MEDICAL HISTORY:  Arteriosclerotic heart disease (ASHD)     Cerebrovascular accident (CVA) 2017    Chronic GERD     DM (diabetes mellitus)     History of TIAs 5/2019    HTN (hypertension)

## 2019-11-11 NOTE — H&P PST ADULT - ASSESSMENT
pilonidal cyst  CAPRINI SCORE    AGE RELATED RISK FACTORS                                                       MOBILITY RELATED FACTORS  [ ] Age 41-60 years                                            (1 Point)                  [ ] Bed rest                                                        (1 Point)  [ ] Age: 61-74 years                                           (2 Points)                [ ] Plaster cast                                                   (2 Points)  [x ] Age= 75 years                                              (3 Points)                 [ ] Bed bound for more than 72 hours                   (2 Points)    DISEASE RELATED RISK FACTORS                                               GENDER SPECIFIC FACTORS  [ ] Edema in the lower extremities                       (1 Point)                  [ ] Pregnancy                                                     (1 Point)  [ ] Varicose veins                                               (1 Point)                  [ ] Post-partum < 6 weeks                                   (1 Point)             [ x] BMI > 25 Kg/m2                                            (1 Point)                  [ ] Hormonal therapy  or oral contraception            (1 Point)                 [ ] Sepsis (in the previous month)                        (1 Point)                  [ ] History of pregnancy complications  [ ] Pneumonia or serious lung disease                                               [ ] Unexplained or recurrent                       (1 Point)           (in the previous month)                               (1 Point)  [ ] Abnormal pulmonary function test                     (1 Point)                 SURGERY RELATED RISK FACTORS  [ ] Acute myocardial infarction                              (1 Point)                 [ ]  Section                                            (1 Point)  [ ] Congestive heart failure (in the previous month)  (1 Point)                 [ ] Minor surgery                                                 (1 Point)   [ ] Inflammatory bowel disease                             (1 Point)                 [ ] Arthroscopic surgery                                        (2 Points)  [ ] Central venous access                                    (2 Points)                x[ ] General surgery lasting more than 45 minutes   (2 Points)       [ ] Stroke (in the previous month)                          (5 Points)               [ ] Elective arthroplasty                                        (5 Points)                                                                                                                                               HEMATOLOGY RELATED FACTORS                                                 TRAUMA RELATED RISK FACTORS  [ ] Prior episodes of VTE                                     (3 Points)                 [ ] Fracture of the hip, pelvis, or leg                       (5 Points)  [ ] Positive family history for VTE                         (3 Points)                 [ ] Acute spinal cord injury (in the previous month)  (5 Points)  [ ] Prothrombin 31481 A                                      (3 Points)                 [ ] Paralysis  (less than 1 month)                          (5 Points)  [ ] Factor V Leiden                                             (3 Points)                 [ ] Multiple Trauma within 1 month                         (5 Points)  [ ] Lupus anticoagulants                                     (3 Points)                                                           [ ] Anticardiolipin antibodies                                (3 Points)                                                       [ ] High homocysteine in the blood                      (3 Points)                                             [ ] Other congenital or acquired thrombophilia       (3 Points)                                                [ ] Heparin induced thrombocytopenia                  (3 Points)                                          Total Score [     6     ]

## 2019-11-11 NOTE — H&P PST ADULT - NSANTHOSAYNRD_GEN_A_CORE
No. [place] screening performed.  STOP BANG Legend: 0-2 = LOW Risk; 3-4 = INTERMEDIATE Risk; 5-8 = HIGH Risk

## 2019-11-12 DIAGNOSIS — I10 ESSENTIAL (PRIMARY) HYPERTENSION: ICD-10-CM

## 2019-11-12 DIAGNOSIS — E78.5 HYPERLIPIDEMIA, UNSPECIFIED: ICD-10-CM

## 2019-11-12 DIAGNOSIS — Z87.2 PERSONAL HISTORY OF DISEASES OF THE SKIN AND SUBCUTANEOUS TISSUE: ICD-10-CM

## 2019-11-12 DIAGNOSIS — E11.9 TYPE 2 DIABETES MELLITUS WITHOUT COMPLICATIONS: ICD-10-CM

## 2019-11-12 DIAGNOSIS — I63.9 CEREBRAL INFARCTION, UNSPECIFIED: ICD-10-CM

## 2019-11-12 RX ORDER — SODIUM CHLORIDE 9 MG/ML
3 INJECTION INTRAMUSCULAR; INTRAVENOUS; SUBCUTANEOUS EVERY 8 HOURS
Refills: 0 | Status: DISCONTINUED | OUTPATIENT
Start: 2019-12-18 | End: 2020-01-03

## 2019-11-20 ENCOUNTER — APPOINTMENT (OUTPATIENT)
Dept: INTERNAL MEDICINE | Facility: CLINIC | Age: 79
End: 2019-11-20
Payer: MEDICARE

## 2019-11-20 VITALS
WEIGHT: 166 LBS | TEMPERATURE: 98.4 F | HEIGHT: 64 IN | HEART RATE: 66 BPM | BODY MASS INDEX: 28.34 KG/M2 | OXYGEN SATURATION: 98 %

## 2019-11-20 VITALS — SYSTOLIC BLOOD PRESSURE: 120 MMHG | DIASTOLIC BLOOD PRESSURE: 80 MMHG

## 2019-11-20 PROCEDURE — 99214 OFFICE O/P EST MOD 30 MIN: CPT

## 2019-11-20 NOTE — HISTORY OF PRESENT ILLNESS
[Atrial Fibrillation] : atrial fibrillation [Coronary Artery Disease] : coronary artery disease [Recent Myocardial Infarction] : recent myocardial infarction [COPD] : COPD [Asthma] : asthma [Smoker] : smoker [Chronic Anticoagulation] : chronic anticoagulation [No Adverse Anesthesia Reaction] : no adverse anesthesia reaction in self or family member [Diabetes] : diabetes [(Patient denies any chest pain, claudication, dyspnea on exertion, orthopnea, palpitations or syncope)] : Patient denies any chest pain, claudication, dyspnea on exertion, orthopnea, palpitations or syncope [Chronic Kidney Disease] : no chronic kidney disease [FreeTextEntry3] : Dr Dave Clarke [FreeTextEntry1] : pilonidal cyst  [FreeTextEntry2] : 11/27/19 [FreeTextEntry7] : EKG done. \par Seen by cardiologist and cleared [FreeTextEntry4] : Mrs. Isaac is a 80 yo F with PMH of asthma, COPD, T2DM, HTN, Old MI presents for preop medical clearance for removal of pilonidal cyst in 1 week. \par Pt reports she is feeing well and has no medical complaints at this time besides pain from pilonidal cyst. \par Pt takes medications as prescribed \par Pt denies CP, fever/chills, syncopal episodes or dizziness.

## 2019-11-20 NOTE — ASSESSMENT
[Patient Optimized for Surgery] : Patient optimized for surgery [FreeTextEntry4] : Mrs. Isaac is a 80 yo F with PMH of asthma, COPD, T2DM, HTN, Old MI presents for preop medical clearance for removal of pilonidal cyst in 1 week. \par Pt reports she is feeing well and has no medical complaints at this time besides pain from pilonidal cyst\par Pt takes medications as prescribed \par Pt denies CP, fever/chills, syncopal episodes or dizziness. \par

## 2019-11-20 NOTE — PHYSICAL EXAM
[No Acute Distress] : no acute distress [Well-Appearing] : well-appearing [Normal Sclera/Conjunctiva] : normal sclera/conjunctiva [No JVD] : no jugular venous distention [Normal Outer Ear/Nose] : the outer ears and nose were normal in appearance [No Accessory Muscle Use] : no accessory muscle use [No Lymphadenopathy] : no lymphadenopathy [Regular Rhythm] : with a regular rhythm [Clear to Auscultation] : lungs were clear to auscultation bilaterally [Normal Rate] : normal rate  [No Extremity Clubbing/Cyanosis] : no extremity clubbing/cyanosis [Non Tender] : non-tender [Soft] : abdomen soft [No Masses] : no abdominal mass palpated [Normal] : soft, non-tender, non-distended, no masses palpated, no HSM and normal bowel sounds [Non-distended] : non-distended [Coordination Grossly Intact] : coordination grossly intact [No Focal Deficits] : no focal deficits [Alert and Oriented x3] : oriented to person, place, and time [de-identified] : pilonidal cyst tender to palpation  [de-identified] : trace edema in bilateral ankles

## 2019-11-21 PROBLEM — E11.9 TYPE 2 DIABETES MELLITUS WITHOUT COMPLICATIONS: Chronic | Status: ACTIVE | Noted: 2019-11-11

## 2019-11-21 PROBLEM — I10 ESSENTIAL (PRIMARY) HYPERTENSION: Chronic | Status: ACTIVE | Noted: 2019-11-11

## 2019-11-21 PROBLEM — I63.9 CEREBRAL INFARCTION, UNSPECIFIED: Chronic | Status: ACTIVE | Noted: 2019-11-11

## 2019-11-21 PROBLEM — Z86.73 PERSONAL HISTORY OF TRANSIENT ISCHEMIC ATTACK (TIA), AND CEREBRAL INFARCTION WITHOUT RESIDUAL DEFICITS: Chronic | Status: ACTIVE | Noted: 2019-11-11

## 2019-11-21 PROBLEM — K21.9 GASTRO-ESOPHAGEAL REFLUX DISEASE WITHOUT ESOPHAGITIS: Chronic | Status: ACTIVE | Noted: 2019-11-11

## 2019-11-21 PROBLEM — I25.10 ATHEROSCLEROTIC HEART DISEASE OF NATIVE CORONARY ARTERY WITHOUT ANGINA PECTORIS: Chronic | Status: ACTIVE | Noted: 2019-11-11

## 2019-11-27 ENCOUNTER — APPOINTMENT (OUTPATIENT)
Dept: SURGERY | Facility: HOSPITAL | Age: 79
End: 2019-11-27

## 2019-12-02 ENCOUNTER — RX RENEWAL (OUTPATIENT)
Age: 79
End: 2019-12-02

## 2019-12-03 ENCOUNTER — RX RENEWAL (OUTPATIENT)
Age: 79
End: 2019-12-03

## 2019-12-04 ENCOUNTER — APPOINTMENT (OUTPATIENT)
Dept: CARDIOLOGY | Facility: CLINIC | Age: 79
End: 2019-12-04

## 2019-12-11 ENCOUNTER — RX RENEWAL (OUTPATIENT)
Age: 79
End: 2019-12-11

## 2019-12-11 RX ORDER — TICAGRELOR 90 MG/1
90 TABLET ORAL TWICE DAILY
Qty: 180 | Refills: 1 | Status: ACTIVE | COMMUNITY
Start: 2019-01-23 | End: 1900-01-01

## 2019-12-17 ENCOUNTER — TRANSCRIPTION ENCOUNTER (OUTPATIENT)
Age: 79
End: 2019-12-17

## 2019-12-18 ENCOUNTER — APPOINTMENT (OUTPATIENT)
Dept: SURGERY | Facility: HOSPITAL | Age: 79
End: 2019-12-18

## 2019-12-18 ENCOUNTER — OUTPATIENT (OUTPATIENT)
Dept: OUTPATIENT SERVICES | Facility: HOSPITAL | Age: 79
LOS: 1 days | Discharge: HOME | End: 2019-12-18
Payer: MEDICARE

## 2019-12-18 ENCOUNTER — RESULT REVIEW (OUTPATIENT)
Age: 79
End: 2019-12-18

## 2019-12-18 VITALS
TEMPERATURE: 97 F | HEIGHT: 64 IN | SYSTOLIC BLOOD PRESSURE: 169 MMHG | DIASTOLIC BLOOD PRESSURE: 50 MMHG | WEIGHT: 164.02 LBS | RESPIRATION RATE: 20 BRPM | HEART RATE: 66 BPM | OXYGEN SATURATION: 97 %

## 2019-12-18 VITALS
RESPIRATION RATE: 16 BRPM | OXYGEN SATURATION: 97 % | SYSTOLIC BLOOD PRESSURE: 160 MMHG | HEART RATE: 63 BPM | DIASTOLIC BLOOD PRESSURE: 60 MMHG

## 2019-12-18 DIAGNOSIS — Z98.890 OTHER SPECIFIED POSTPROCEDURAL STATES: Chronic | ICD-10-CM

## 2019-12-18 DIAGNOSIS — Z90.49 ACQUIRED ABSENCE OF OTHER SPECIFIED PARTS OF DIGESTIVE TRACT: Chronic | ICD-10-CM

## 2019-12-18 DIAGNOSIS — Z98.49 CATARACT EXTRACTION STATUS, UNSPECIFIED EYE: Chronic | ICD-10-CM

## 2019-12-18 PROCEDURE — 11770 EXC PILONIDAL CYST SIMPLE: CPT

## 2019-12-18 PROCEDURE — 88304 TISSUE EXAM BY PATHOLOGIST: CPT | Mod: 26

## 2019-12-18 RX ORDER — OXYCODONE HYDROCHLORIDE 5 MG/1
1 TABLET ORAL
Qty: 20 | Refills: 0
Start: 2019-12-18 | End: 2019-12-22

## 2019-12-18 RX ORDER — DOCUSATE SODIUM 100 MG
1 CAPSULE ORAL
Qty: 30 | Refills: 0
Start: 2019-12-18 | End: 2019-12-27

## 2019-12-18 RX ADMIN — SODIUM CHLORIDE 3 MILLILITER(S): 9 INJECTION INTRAMUSCULAR; INTRAVENOUS; SUBCUTANEOUS at 13:25

## 2019-12-18 NOTE — ASU PATIENT PROFILE, ADULT - PMH
Arteriosclerotic heart disease (ASHD)    Cerebrovascular accident (CVA)  2017  Chronic GERD    DM (diabetes mellitus)    History of TIAs  5/2019  HTN (hypertension)

## 2019-12-18 NOTE — ASU DISCHARGE PLAN (ADULT/PEDIATRIC) - CARE PROVIDER_API CALL
Vince Acosta)  Surgery  [address], [address] [place] [address]  Phone: [phone]  Fax: [phone]  Follow Up Time:

## 2019-12-18 NOTE — ASU PATIENT PROFILE, ADULT - ABILITY TO HEAR (WITH HEARING AID OR HEARING APPLIANCE IF NORMALLY USED):
Mildly to Moderately Impaired: difficulty hearing in some environments or speaker may need to increase volume or speak distinctly/wear hearing aids

## 2019-12-27 ENCOUNTER — INPATIENT (INPATIENT)
Facility: HOSPITAL | Age: 79
LOS: 2 days | Discharge: HOME | End: 2019-12-30
Attending: SURGERY | Admitting: SURGERY
Payer: MEDICARE

## 2019-12-27 VITALS
SYSTOLIC BLOOD PRESSURE: 143 MMHG | WEIGHT: 164.91 LBS | DIASTOLIC BLOOD PRESSURE: 76 MMHG | OXYGEN SATURATION: 95 % | HEART RATE: 73 BPM | RESPIRATION RATE: 18 BRPM | HEIGHT: 64 IN | TEMPERATURE: 98 F

## 2019-12-27 DIAGNOSIS — Z90.49 ACQUIRED ABSENCE OF OTHER SPECIFIED PARTS OF DIGESTIVE TRACT: ICD-10-CM

## 2019-12-27 DIAGNOSIS — I63.9 CEREBRAL INFARCTION, UNSPECIFIED: ICD-10-CM

## 2019-12-27 DIAGNOSIS — E11.9 TYPE 2 DIABETES MELLITUS WITHOUT COMPLICATIONS: ICD-10-CM

## 2019-12-27 DIAGNOSIS — Z98.890 OTHER SPECIFIED POSTPROCEDURAL STATES: Chronic | ICD-10-CM

## 2019-12-27 DIAGNOSIS — I50.9 HEART FAILURE, UNSPECIFIED: ICD-10-CM

## 2019-12-27 DIAGNOSIS — Z79.82 LONG TERM (CURRENT) USE OF ASPIRIN: ICD-10-CM

## 2019-12-27 DIAGNOSIS — K21.9 GASTRO-ESOPHAGEAL REFLUX DISEASE WITHOUT ESOPHAGITIS: ICD-10-CM

## 2019-12-27 DIAGNOSIS — J44.9 CHRONIC OBSTRUCTIVE PULMONARY DISEASE, UNSPECIFIED: ICD-10-CM

## 2019-12-27 DIAGNOSIS — I10 ESSENTIAL (PRIMARY) HYPERTENSION: ICD-10-CM

## 2019-12-27 DIAGNOSIS — I25.2 OLD MYOCARDIAL INFARCTION: ICD-10-CM

## 2019-12-27 DIAGNOSIS — T14.8XXA OTHER INJURY OF UNSPECIFIED BODY REGION, INITIAL ENCOUNTER: ICD-10-CM

## 2019-12-27 DIAGNOSIS — F32.9 MAJOR DEPRESSIVE DISORDER, SINGLE EPISODE, UNSPECIFIED: ICD-10-CM

## 2019-12-27 DIAGNOSIS — Z98.49 CATARACT EXTRACTION STATUS, UNSPECIFIED EYE: Chronic | ICD-10-CM

## 2019-12-27 DIAGNOSIS — L05.91 PILONIDAL CYST WITHOUT ABSCESS: ICD-10-CM

## 2019-12-27 DIAGNOSIS — Z90.49 ACQUIRED ABSENCE OF OTHER SPECIFIED PARTS OF DIGESTIVE TRACT: Chronic | ICD-10-CM

## 2019-12-27 DIAGNOSIS — I25.10 ATHEROSCLEROTIC HEART DISEASE OF NATIVE CORONARY ARTERY WITHOUT ANGINA PECTORIS: ICD-10-CM

## 2019-12-27 DIAGNOSIS — Z95.5 PRESENCE OF CORONARY ANGIOPLASTY IMPLANT AND GRAFT: ICD-10-CM

## 2019-12-27 DIAGNOSIS — Z86.73 PERSONAL HISTORY OF TRANSIENT ISCHEMIC ATTACK (TIA), AND CEREBRAL INFARCTION WITHOUT RESIDUAL DEFICITS: ICD-10-CM

## 2019-12-27 LAB
ALBUMIN SERPL ELPH-MCNC: 3 G/DL — LOW (ref 3.3–5)
ALP SERPL-CCNC: 95 U/L — SIGNIFICANT CHANGE UP (ref 40–120)
ALT FLD-CCNC: 13 U/L — SIGNIFICANT CHANGE UP (ref 12–78)
ANION GAP SERPL CALC-SCNC: 12 MMOL/L — SIGNIFICANT CHANGE UP (ref 5–17)
APPEARANCE UR: CLEAR — SIGNIFICANT CHANGE UP
APTT BLD: 30 SEC — SIGNIFICANT CHANGE UP (ref 27.5–36.3)
AST SERPL-CCNC: 11 U/L — LOW (ref 15–37)
BACTERIA # UR AUTO: ABNORMAL
BASOPHILS # BLD AUTO: 0.05 K/UL — SIGNIFICANT CHANGE UP (ref 0–0.2)
BASOPHILS NFR BLD AUTO: 0.3 % — SIGNIFICANT CHANGE UP (ref 0–2)
BILIRUB SERPL-MCNC: 0.4 MG/DL — SIGNIFICANT CHANGE UP (ref 0.2–1.2)
BILIRUB UR-MCNC: NEGATIVE — SIGNIFICANT CHANGE UP
BUN SERPL-MCNC: 37 MG/DL — HIGH (ref 7–23)
CALCIUM SERPL-MCNC: 8.6 MG/DL — SIGNIFICANT CHANGE UP (ref 8.5–10.1)
CHLORIDE SERPL-SCNC: 107 MMOL/L — SIGNIFICANT CHANGE UP (ref 96–108)
CO2 SERPL-SCNC: 17 MMOL/L — LOW (ref 22–31)
COLOR SPEC: YELLOW — SIGNIFICANT CHANGE UP
CREAT SERPL-MCNC: 1.61 MG/DL — HIGH (ref 0.5–1.3)
DIFF PNL FLD: ABNORMAL
EOSINOPHIL # BLD AUTO: 0.33 K/UL — SIGNIFICANT CHANGE UP (ref 0–0.5)
EOSINOPHIL NFR BLD AUTO: 2.2 % — SIGNIFICANT CHANGE UP (ref 0–6)
EPI CELLS # UR: SIGNIFICANT CHANGE UP
GLUCOSE SERPL-MCNC: 240 MG/DL — HIGH (ref 70–99)
GLUCOSE UR QL: 1000 MG/DL
HCT VFR BLD CALC: 32.3 % — LOW (ref 34.5–45)
HGB BLD-MCNC: 11 G/DL — LOW (ref 11.5–15.5)
IMM GRANULOCYTES NFR BLD AUTO: 0.7 % — SIGNIFICANT CHANGE UP (ref 0–1.5)
INR BLD: 1.09 RATIO — SIGNIFICANT CHANGE UP (ref 0.88–1.16)
KETONES UR-MCNC: NEGATIVE — SIGNIFICANT CHANGE UP
LEUKOCYTE ESTERASE UR-ACNC: NEGATIVE — SIGNIFICANT CHANGE UP
LYMPHOCYTES # BLD AUTO: 1.53 K/UL — SIGNIFICANT CHANGE UP (ref 1–3.3)
LYMPHOCYTES # BLD AUTO: 10.4 % — LOW (ref 13–44)
MCHC RBC-ENTMCNC: 29.3 PG — SIGNIFICANT CHANGE UP (ref 27–34)
MCHC RBC-ENTMCNC: 34.1 GM/DL — SIGNIFICANT CHANGE UP (ref 32–36)
MCV RBC AUTO: 86.1 FL — SIGNIFICANT CHANGE UP (ref 80–100)
MONOCYTES # BLD AUTO: 1.17 K/UL — HIGH (ref 0–0.9)
MONOCYTES NFR BLD AUTO: 7.9 % — SIGNIFICANT CHANGE UP (ref 2–14)
NEUTROPHILS # BLD AUTO: 11.58 K/UL — HIGH (ref 1.8–7.4)
NEUTROPHILS NFR BLD AUTO: 78.5 % — HIGH (ref 43–77)
NITRITE UR-MCNC: NEGATIVE — SIGNIFICANT CHANGE UP
NRBC # BLD: 0 /100 WBCS — SIGNIFICANT CHANGE UP (ref 0–0)
PH UR: 5 — SIGNIFICANT CHANGE UP (ref 5–8)
PLATELET # BLD AUTO: 263 K/UL — SIGNIFICANT CHANGE UP (ref 150–400)
POTASSIUM SERPL-MCNC: 4.3 MMOL/L — SIGNIFICANT CHANGE UP (ref 3.5–5.3)
POTASSIUM SERPL-SCNC: 4.3 MMOL/L — SIGNIFICANT CHANGE UP (ref 3.5–5.3)
PROT SERPL-MCNC: 6.8 GM/DL — SIGNIFICANT CHANGE UP (ref 6–8.3)
PROT UR-MCNC: 100 MG/DL
PROTHROM AB SERPL-ACNC: 12.2 SEC — SIGNIFICANT CHANGE UP (ref 10–12.9)
RBC # BLD: 3.75 M/UL — LOW (ref 3.8–5.2)
RBC # FLD: 13.1 % — SIGNIFICANT CHANGE UP (ref 10.3–14.5)
RBC CASTS # UR COMP ASSIST: SIGNIFICANT CHANGE UP /HPF (ref 0–4)
SODIUM SERPL-SCNC: 136 MMOL/L — SIGNIFICANT CHANGE UP (ref 135–145)
SP GR SPEC: 1.01 — SIGNIFICANT CHANGE UP (ref 1.01–1.02)
UROBILINOGEN FLD QL: NEGATIVE MG/DL — SIGNIFICANT CHANGE UP
WBC # BLD: 14.76 K/UL — HIGH (ref 3.8–10.5)
WBC # FLD AUTO: 14.76 K/UL — HIGH (ref 3.8–10.5)
WBC UR QL: SIGNIFICANT CHANGE UP

## 2019-12-27 PROCEDURE — 99285 EMERGENCY DEPT VISIT HI MDM: CPT

## 2019-12-27 PROCEDURE — 10140 I&D HMTMA SEROMA/FLUID COLLJ: CPT

## 2019-12-27 PROCEDURE — 93010 ELECTROCARDIOGRAM REPORT: CPT

## 2019-12-27 PROCEDURE — 71045 X-RAY EXAM CHEST 1 VIEW: CPT | Mod: 26

## 2019-12-27 RX ORDER — SODIUM CHLORIDE 9 MG/ML
1000 INJECTION, SOLUTION INTRAVENOUS
Refills: 0 | Status: DISCONTINUED | OUTPATIENT
Start: 2019-12-27 | End: 2019-12-30

## 2019-12-27 RX ORDER — TICAGRELOR 90 MG/1
90 TABLET ORAL EVERY 12 HOURS
Refills: 0 | Status: DISCONTINUED | OUTPATIENT
Start: 2019-12-28 | End: 2019-12-30

## 2019-12-27 RX ORDER — OXYCODONE HYDROCHLORIDE 5 MG/1
10 TABLET ORAL EVERY 6 HOURS
Refills: 0 | Status: DISCONTINUED | OUTPATIENT
Start: 2019-12-27 | End: 2019-12-30

## 2019-12-27 RX ORDER — DEXTROSE 50 % IN WATER 50 %
15 SYRINGE (ML) INTRAVENOUS ONCE
Refills: 0 | Status: DISCONTINUED | OUTPATIENT
Start: 2019-12-27 | End: 2019-12-30

## 2019-12-27 RX ORDER — DEXTROSE 50 % IN WATER 50 %
12.5 SYRINGE (ML) INTRAVENOUS ONCE
Refills: 0 | Status: DISCONTINUED | OUTPATIENT
Start: 2019-12-27 | End: 2019-12-30

## 2019-12-27 RX ORDER — CITALOPRAM 10 MG/1
20 TABLET, FILM COATED ORAL DAILY
Refills: 0 | Status: DISCONTINUED | OUTPATIENT
Start: 2019-12-27 | End: 2019-12-30

## 2019-12-27 RX ORDER — HEPARIN SODIUM 5000 [USP'U]/ML
5000 INJECTION INTRAVENOUS; SUBCUTANEOUS EVERY 12 HOURS
Refills: 0 | Status: DISCONTINUED | OUTPATIENT
Start: 2019-12-27 | End: 2019-12-27

## 2019-12-27 RX ORDER — IPRATROPIUM/ALBUTEROL SULFATE 18-103MCG
3 AEROSOL WITH ADAPTER (GRAM) INHALATION ONCE
Refills: 0 | Status: COMPLETED | OUTPATIENT
Start: 2019-12-27 | End: 2019-12-27

## 2019-12-27 RX ORDER — OXYCODONE HYDROCHLORIDE 5 MG/1
5 TABLET ORAL EVERY 6 HOURS
Refills: 0 | Status: DISCONTINUED | OUTPATIENT
Start: 2019-12-27 | End: 2019-12-30

## 2019-12-27 RX ORDER — AMLODIPINE BESYLATE 2.5 MG/1
10 TABLET ORAL DAILY
Refills: 0 | Status: DISCONTINUED | OUTPATIENT
Start: 2019-12-27 | End: 2019-12-30

## 2019-12-27 RX ORDER — INSULIN ASPART 100 [IU]/ML
1 INJECTION, SOLUTION SUBCUTANEOUS
Qty: 0 | Refills: 0 | DISCHARGE

## 2019-12-27 RX ORDER — INSULIN LISPRO 100/ML
VIAL (ML) SUBCUTANEOUS AT BEDTIME
Refills: 0 | Status: DISCONTINUED | OUTPATIENT
Start: 2019-12-27 | End: 2019-12-28

## 2019-12-27 RX ORDER — ONDANSETRON 8 MG/1
4 TABLET, FILM COATED ORAL EVERY 6 HOURS
Refills: 0 | Status: DISCONTINUED | OUTPATIENT
Start: 2019-12-27 | End: 2019-12-30

## 2019-12-27 RX ORDER — DEXTROSE 50 % IN WATER 50 %
25 SYRINGE (ML) INTRAVENOUS ONCE
Refills: 0 | Status: DISCONTINUED | OUTPATIENT
Start: 2019-12-27 | End: 2019-12-30

## 2019-12-27 RX ORDER — PIPERACILLIN AND TAZOBACTAM 4; .5 G/20ML; G/20ML
3.38 INJECTION, POWDER, LYOPHILIZED, FOR SOLUTION INTRAVENOUS EVERY 8 HOURS
Refills: 0 | Status: DISCONTINUED | OUTPATIENT
Start: 2019-12-27 | End: 2019-12-30

## 2019-12-27 RX ORDER — CARVEDILOL PHOSPHATE 80 MG/1
6.25 CAPSULE, EXTENDED RELEASE ORAL EVERY 12 HOURS
Refills: 0 | Status: DISCONTINUED | OUTPATIENT
Start: 2019-12-27 | End: 2019-12-30

## 2019-12-27 RX ORDER — INFLUENZA VIRUS VACCINE 15; 15; 15; 15 UG/.5ML; UG/.5ML; UG/.5ML; UG/.5ML
0.5 SUSPENSION INTRAMUSCULAR ONCE
Refills: 0 | Status: COMPLETED | OUTPATIENT
Start: 2019-12-27 | End: 2019-12-30

## 2019-12-27 RX ORDER — OMEPRAZOLE 10 MG/1
1 CAPSULE, DELAYED RELEASE ORAL
Qty: 0 | Refills: 0 | DISCHARGE

## 2019-12-27 RX ORDER — PIPERACILLIN AND TAZOBACTAM 4; .5 G/20ML; G/20ML
3.38 INJECTION, POWDER, LYOPHILIZED, FOR SOLUTION INTRAVENOUS ONCE
Refills: 0 | Status: COMPLETED | OUTPATIENT
Start: 2019-12-27 | End: 2019-12-27

## 2019-12-27 RX ORDER — INSULIN LISPRO 100/ML
VIAL (ML) SUBCUTANEOUS
Refills: 0 | Status: DISCONTINUED | OUTPATIENT
Start: 2019-12-27 | End: 2019-12-30

## 2019-12-27 RX ORDER — ACETAMINOPHEN 500 MG
2 TABLET ORAL
Qty: 0 | Refills: 0 | DISCHARGE

## 2019-12-27 RX ORDER — GLUCAGON INJECTION, SOLUTION 0.5 MG/.1ML
1 INJECTION, SOLUTION SUBCUTANEOUS ONCE
Refills: 0 | Status: DISCONTINUED | OUTPATIENT
Start: 2019-12-27 | End: 2019-12-30

## 2019-12-27 RX ORDER — ACETAMINOPHEN 500 MG
650 TABLET ORAL EVERY 6 HOURS
Refills: 0 | Status: DISCONTINUED | OUTPATIENT
Start: 2019-12-27 | End: 2019-12-30

## 2019-12-27 RX ORDER — ROSUVASTATIN CALCIUM 5 MG/1
1 TABLET ORAL
Qty: 0 | Refills: 0 | DISCHARGE

## 2019-12-27 RX ORDER — PANTOPRAZOLE SODIUM 20 MG/1
40 TABLET, DELAYED RELEASE ORAL
Refills: 0 | Status: DISCONTINUED | OUTPATIENT
Start: 2019-12-27 | End: 2019-12-30

## 2019-12-27 RX ADMIN — PIPERACILLIN AND TAZOBACTAM 200 GRAM(S): 4; .5 INJECTION, POWDER, LYOPHILIZED, FOR SOLUTION INTRAVENOUS at 20:08

## 2019-12-27 RX ADMIN — OXYCODONE HYDROCHLORIDE 10 MILLIGRAM(S): 5 TABLET ORAL at 23:12

## 2019-12-27 RX ADMIN — Medication 650 MILLIGRAM(S): at 21:37

## 2019-12-27 RX ADMIN — Medication 3 MILLILITER(S): at 20:59

## 2019-12-27 RX ADMIN — SODIUM CHLORIDE 100 MILLILITER(S): 9 INJECTION, SOLUTION INTRAVENOUS at 20:59

## 2019-12-27 RX ADMIN — SODIUM CHLORIDE 100 MILLILITER(S): 9 INJECTION, SOLUTION INTRAVENOUS at 23:12

## 2019-12-27 RX ADMIN — Medication 650 MILLIGRAM(S): at 22:30

## 2019-12-27 RX ADMIN — Medication 1: at 21:39

## 2019-12-27 NOTE — H&P ADULT - NSHPLABSRESULTS_GEN_ALL_CORE
11.0   14.76 )-----------( 263      ( 27 Dec 2019 20:15 )             32.3   12-27    136  |  107  |  37<H>  ----------------------------<  240<H>  4.3   |  17<L>  |  1.61<H>    Ca    8.6      27 Dec 2019 20:15    TPro  6.8  /  Alb  3.0<L>  /  TBili  0.4  /  DBili  x   /  AST  11<L>  /  ALT  13  /  AlkPhos  95  12-27

## 2019-12-27 NOTE — H&P ADULT - NSHPPHYSICALEXAM_GEN_ALL_CORE
PHYSICAL EXAM:  GENERAL: NAD, well-developed  HEAD:  Atraumatic, Normocephalic  EYES: Conjunctiva and sclera clear  ENMT: No tonsillar erythema, exudates, or enlargement; Moist mucous membranes, No lesions  NECK: Supple, No JVD, Normal thyroid  NERVOUS SYSTEM:  Alert & Oriented X3  CHEST/LUNG: Clear to auscultation bilaterally; No rales, rhonchi, wheezing  HEART: Regular rate and rhythm. S1S2  ABDOMEN: Nondistended, +BS, soft, nontender, no guarding, no rigidity   BUTTOCK/BACK: Dressing clean/dry/intact. Patient had an I&D with Dr. Acosta this evening.  EXTREMITIES:  2+ Peripheral Pulses, No clubbing, cyanosis, or edema

## 2019-12-27 NOTE — ED ADULT NURSE REASSESSMENT NOTE - NS ED NURSE REASSESS COMMENT FT1
incision and drainage done by dr horowitz , consent on chart pt tolerated procedure well.packing done by md

## 2019-12-27 NOTE — ED PROVIDER NOTE - OBJECTIVE STATEMENT
78 yo f with pmh HTN, CVA, DM s/p pilonidal cystectomy 10 days ago, transferred from [place] after CT for possible pilonidal infection given pt with pain in area. Pt seen by Dr Acosta in ER, s/p Incision and drainage. Recommend admission and zosyn.  no fever.     ROS: No fever/chills. No photophobia/eye pain/changes in vision, No ear pain/sore throat/dysphagia, No chest pain/palpitations. No SOB/cough. No abdominal pain, No N/V/D, no black/bloody bm. No dysuria/frequency/discharge, No headache. No Dizziness.  No rash.  No numbness/tingling/weakness.

## 2019-12-27 NOTE — H&P ADULT - HISTORY OF PRESENT ILLNESS
78 y/o female PMHx GERD, DM, TIA, CVA, CAD, HTN, MI, open cholecystectomy, wrist/shoulder/ankle surgeries, excision of pilonidal cyst 12/18/19 c/o pain at her wound x 4 days. Reports that she has been showering and changing the dressings. Pt lives at home alone. Normal eating habits. Normal BM/flatus and urination. Patient denies fever, chills, nausea, vomiting, constipation, diarrhea, dysuria, hematuria, melena, hematochezia, chest pain, shortness of breath, dizziness.

## 2019-12-27 NOTE — PATIENT PROFILE ADULT - ABILITY TO HEAR (WITH HEARING AID OR HEARING APPLIANCE IF NORMALLY USED):
right ear. no hearing aide/Mildly to Moderately Impaired: difficulty hearing in some environments or speaker may need to increase volume or speak distinctly Applied

## 2019-12-27 NOTE — H&P ADULT - ASSESSMENT
80 y/o female PMHx GERD, DM, TIA, CVA, CAD, HTN, MI, open cholecystectomy, wrist/shoulder/ankle surgeries, excision of pilonidal cyst 12/18/19 c/o pain at her wound x 4 days. S/p I&D today at bedside.

## 2019-12-27 NOTE — ED PROVIDER NOTE - PHYSICAL EXAMINATION
Gen: Alert, Well appearing. NAD    Head: NC, AT, PERRL, normal lids/conjunctiva   ENT: Bilateral TM WNL, patent oropharynx without erythema/exudate, uvula midline  Neck: supple, no tenderness/meningismus  Pulm: Bilateral clear BS, normal resp effort  CV: RRR, no M/R/G, +dist pulses   Abd: soft, NT/ND, +BS, no guarding/rebound tenderness  Mskel: no edema  Skin: + s/p Incision and drainage by Dr. Acosta. + mild erythema,   Neuro: AAO, no sensory/motor deficits,

## 2019-12-27 NOTE — H&P ADULT - PROBLEM SELECTOR PLAN 1
-Admit patient  -IV Zosyn  -Daily wound care with iodoform packing  -f/u labs  -DVT ppx  -Discussed with Dr. Acosta

## 2019-12-27 NOTE — ED ADULT NURSE NOTE - NSIMPLEMENTINTERV_GEN_ALL_ED
Implemented All Fall Risk Interventions:  [place] to call system. Call bell, personal items and telephone within reach. Instruct patient to call for assistance. Room bathroom lighting operational. Non-slip footwear when patient is off stretcher. Physically safe environment: no spills, clutter or unnecessary equipment. Stretcher in lowest position, wheels locked, appropriate side rails in place. Provide visual cue, wrist band, yellow gown, etc. Monitor gait and stability. Monitor for mental status changes and reorient to person, place, and time. Review medications for side effects contributing to fall risk. Reinforce activity limits and safety measures with patient and family.

## 2019-12-27 NOTE — H&P ADULT - NSICDXPASTSURGICALHX_GEN_ALL_CORE_FT
PAST SURGICAL HISTORY:  H/O shoulder surgery left    H/O wrist surgery hardware    History of excision of pilonidal cyst 12/18/19    S/P cataract surgery b/l    S/P keny open

## 2019-12-28 LAB
ANION GAP SERPL CALC-SCNC: 13 MMOL/L — SIGNIFICANT CHANGE UP (ref 5–17)
BUN SERPL-MCNC: 35 MG/DL — HIGH (ref 7–23)
CALCIUM SERPL-MCNC: 8.7 MG/DL — SIGNIFICANT CHANGE UP (ref 8.5–10.1)
CHLORIDE SERPL-SCNC: 106 MMOL/L — SIGNIFICANT CHANGE UP (ref 96–108)
CO2 SERPL-SCNC: 20 MMOL/L — LOW (ref 22–31)
CREAT SERPL-MCNC: 1.48 MG/DL — HIGH (ref 0.5–1.3)
GLUCOSE SERPL-MCNC: 238 MG/DL — HIGH (ref 70–99)
HBA1C BLD-MCNC: 7.3 % — HIGH (ref 4–5.6)
HCT VFR BLD CALC: 32.5 % — LOW (ref 34.5–45)
HGB BLD-MCNC: 10.9 G/DL — LOW (ref 11.5–15.5)
MCHC RBC-ENTMCNC: 29.2 PG — SIGNIFICANT CHANGE UP (ref 27–34)
MCHC RBC-ENTMCNC: 33.5 GM/DL — SIGNIFICANT CHANGE UP (ref 32–36)
MCV RBC AUTO: 87.1 FL — SIGNIFICANT CHANGE UP (ref 80–100)
NRBC # BLD: 0 /100 WBCS — SIGNIFICANT CHANGE UP (ref 0–0)
PLATELET # BLD AUTO: 260 K/UL — SIGNIFICANT CHANGE UP (ref 150–400)
POTASSIUM SERPL-MCNC: 3.9 MMOL/L — SIGNIFICANT CHANGE UP (ref 3.5–5.3)
POTASSIUM SERPL-SCNC: 3.9 MMOL/L — SIGNIFICANT CHANGE UP (ref 3.5–5.3)
RBC # BLD: 3.73 M/UL — LOW (ref 3.8–5.2)
RBC # FLD: 13.2 % — SIGNIFICANT CHANGE UP (ref 10.3–14.5)
SODIUM SERPL-SCNC: 139 MMOL/L — SIGNIFICANT CHANGE UP (ref 135–145)
WBC # BLD: 9.97 K/UL — SIGNIFICANT CHANGE UP (ref 3.8–10.5)
WBC # FLD AUTO: 9.97 K/UL — SIGNIFICANT CHANGE UP (ref 3.8–10.5)

## 2019-12-28 PROCEDURE — 99233 SBSQ HOSP IP/OBS HIGH 50: CPT

## 2019-12-28 RX ORDER — INSULIN LISPRO 100/ML
4 VIAL (ML) SUBCUTANEOUS
Refills: 0 | Status: DISCONTINUED | OUTPATIENT
Start: 2019-12-28 | End: 2019-12-29

## 2019-12-28 RX ORDER — ALBUTEROL 90 UG/1
2.5 AEROSOL, METERED ORAL EVERY 6 HOURS
Refills: 0 | Status: DISCONTINUED | OUTPATIENT
Start: 2019-12-28 | End: 2019-12-28

## 2019-12-28 RX ORDER — INSULIN GLARGINE 100 [IU]/ML
14 INJECTION, SOLUTION SUBCUTANEOUS AT BEDTIME
Refills: 0 | Status: DISCONTINUED | OUTPATIENT
Start: 2019-12-28 | End: 2019-12-30

## 2019-12-28 RX ORDER — ALBUTEROL 90 UG/1
2.5 AEROSOL, METERED ORAL EVERY 6 HOURS
Refills: 0 | Status: DISCONTINUED | OUTPATIENT
Start: 2019-12-28 | End: 2019-12-30

## 2019-12-28 RX ADMIN — CITALOPRAM 20 MILLIGRAM(S): 10 TABLET, FILM COATED ORAL at 12:23

## 2019-12-28 RX ADMIN — AMLODIPINE BESYLATE 10 MILLIGRAM(S): 2.5 TABLET ORAL at 05:34

## 2019-12-28 RX ADMIN — PIPERACILLIN AND TAZOBACTAM 25 GRAM(S): 4; .5 INJECTION, POWDER, LYOPHILIZED, FOR SOLUTION INTRAVENOUS at 05:33

## 2019-12-28 RX ADMIN — ONDANSETRON 4 MILLIGRAM(S): 8 TABLET, FILM COATED ORAL at 12:24

## 2019-12-28 RX ADMIN — TICAGRELOR 90 MILLIGRAM(S): 90 TABLET ORAL at 17:10

## 2019-12-28 RX ADMIN — Medication 3: at 07:46

## 2019-12-28 RX ADMIN — Medication 3: at 10:51

## 2019-12-28 RX ADMIN — PIPERACILLIN AND TAZOBACTAM 25 GRAM(S): 4; .5 INJECTION, POWDER, LYOPHILIZED, FOR SOLUTION INTRAVENOUS at 13:06

## 2019-12-28 RX ADMIN — ALBUTEROL 2.5 MILLIGRAM(S): 90 AEROSOL, METERED ORAL at 05:02

## 2019-12-28 RX ADMIN — OXYCODONE HYDROCHLORIDE 10 MILLIGRAM(S): 5 TABLET ORAL at 05:33

## 2019-12-28 RX ADMIN — OXYCODONE HYDROCHLORIDE 10 MILLIGRAM(S): 5 TABLET ORAL at 06:33

## 2019-12-28 RX ADMIN — PIPERACILLIN AND TAZOBACTAM 25 GRAM(S): 4; .5 INJECTION, POWDER, LYOPHILIZED, FOR SOLUTION INTRAVENOUS at 21:57

## 2019-12-28 RX ADMIN — OXYCODONE HYDROCHLORIDE 10 MILLIGRAM(S): 5 TABLET ORAL at 00:12

## 2019-12-28 RX ADMIN — TICAGRELOR 90 MILLIGRAM(S): 90 TABLET ORAL at 05:34

## 2019-12-28 RX ADMIN — CARVEDILOL PHOSPHATE 6.25 MILLIGRAM(S): 80 CAPSULE, EXTENDED RELEASE ORAL at 17:09

## 2019-12-28 RX ADMIN — PANTOPRAZOLE SODIUM 40 MILLIGRAM(S): 20 TABLET, DELAYED RELEASE ORAL at 07:46

## 2019-12-28 RX ADMIN — CARVEDILOL PHOSPHATE 6.25 MILLIGRAM(S): 80 CAPSULE, EXTENDED RELEASE ORAL at 05:34

## 2019-12-28 RX ADMIN — Medication 3: at 17:09

## 2019-12-28 RX ADMIN — SODIUM CHLORIDE 100 MILLILITER(S): 9 INJECTION, SOLUTION INTRAVENOUS at 12:24

## 2019-12-28 RX ADMIN — INSULIN GLARGINE 14 UNIT(S): 100 INJECTION, SOLUTION SUBCUTANEOUS at 21:57

## 2019-12-28 RX ADMIN — Medication 4 UNIT(S): at 17:09

## 2019-12-28 NOTE — CONSULT NOTE ADULT - SUBJECTIVE AND OBJECTIVE BOX
HPI:  80 y/o female PMHx GERD, DM, TIA, CVA, CAD, HTN, MI, open cholecystectomy, wrist/shoulder/ankle surgeries, excision of pilonidal cyst 19 c/o pain at her wound x 4 days. Reports that she has been showering and changing the dressings. Pt lives at home alone. Normal eating habits. Normal BM/flatus and urination. Patient denies fever, chills, nausea, vomiting, constipation, diarrhea, dysuria, hematuria, melena, hematochezia, chest pain, shortness of breath, dizziness. (27 Dec 2019 20:48)    Patient seen and examined bedside.   No complaints.   states pain controlled   Denies fever, chills, N/V, dizziness, HA, cough, CP, palpitations, SOB, abdominal pain, dysuria, diarrhea, constipation.       PAST MEDICAL & SURGICAL HISTORY:  Chronic GERD  DM (diabetes mellitus)  History of TIAs: 2019  Cerebrovascular accident (CVA): 2017  Arteriosclerotic heart disease (ASHD)  HTN (hypertension)  History of excision of pilonidal cyst: 19  S/P cataract surgery: b/l  H/O wrist surgery: hardware  H/O shoulder surgery: left  S/P keny: open      HOME MEDS:  · 	Brilinta (ticagrelor) 90 mg oral tablet: Last Dose Taken:  , 1 tab(s) orally 2 times a day  · 	amLODIPine 10 mg oral tablet: Last Dose Taken:  , 1 tab(s) orally once a day  · 	carvedilol 6.25 mg oral tablet: Last Dose Taken:  , 1 tab(s) orally 2 times a day  · 	citalopram 20 mg oral tablet: Last Dose Taken:  , 1 tab(s) orally once a day  · 	omeprazole 20 mg oral delayed release capsule: 1 cap(s) orally once a day  · 	Jardiance 10 mg oral tablet: 1 tab(s) orally once a day (in the morning)  · 	Janumet 50 mg-500 mg oral tablet: 1 tab(s) orally once a day  · 	rosuvastatin 10 mg oral tablet: 1 tab(s) orally once a day  · 	NovoLO dose(s) subcutaneous 3 times a day  · 	levimir: 14 unit(s) subcutaneous once a day      MEDICATIONS  (STANDING):  amLODIPine   Tablet 10 milliGRAM(s) Oral daily  carvedilol 6.25 milliGRAM(s) Oral every 12 hours  citalopram 20 milliGRAM(s) Oral daily  dextrose 5%. 1000 milliLiter(s) (50 mL/Hr) IV Continuous <Continuous>  dextrose 50% Injectable 12.5 Gram(s) IV Push once  dextrose 50% Injectable 25 Gram(s) IV Push once  dextrose 50% Injectable 25 Gram(s) IV Push once  influenza   Vaccine 0.5 milliLiter(s) IntraMuscular once  insulin glargine Injectable (LANTUS) 14 Unit(s) SubCutaneous at bedtime  insulin lispro (HumaLOG) corrective regimen sliding scale   SubCutaneous three times a day before meals  insulin lispro (HumaLOG) corrective regimen sliding scale   SubCutaneous at bedtime  insulin lispro Injectable (HumaLOG) 4 Unit(s) SubCutaneous before breakfast  insulin lispro Injectable (HumaLOG) 4 Unit(s) SubCutaneous before dinner  lactated ringers. 1000 milliLiter(s) (100 mL/Hr) IV Continuous <Continuous>  pantoprazole    Tablet 40 milliGRAM(s) Oral before breakfast  piperacillin/tazobactam IVPB.. 3.375 Gram(s) IV Intermittent every 8 hours  ticagrelor 90 milliGRAM(s) Oral every 12 hours    MEDICATIONS  (PRN):  acetaminophen   Tablet .. 650 milliGRAM(s) Oral every 6 hours PRN Temp greater or equal to 38C (100.4F), Mild Pain (1 - 3)  ALBUTerol    0.083% 2.5 milliGRAM(s) Nebulizer every 6 hours PRN Shortness of Breath and/or Wheezing  dextrose 40% Gel 15 Gram(s) Oral once PRN Blood Glucose LESS THAN 70 milliGRAM(s)/deciliter  glucagon  Injectable 1 milliGRAM(s) IntraMuscular once PRN Glucose LESS THAN 70 milligrams/deciliter  ondansetron Injectable 4 milliGRAM(s) IV Push every 6 hours PRN Nausea  oxyCODONE    IR 10 milliGRAM(s) Oral every 6 hours PRN Severe Pain (7 - 10)  oxyCODONE    IR 5 milliGRAM(s) Oral every 6 hours PRN Moderate Pain (4 - 6)      Allergies    Aleve (Other)  codeine (Unknown)  Levaquin (Unknown)  Lipitor (Unknown)    Intolerances        SOCIAL HISTORY: DENIES ETOH/tobacco/illicit drug use     FAMILY HISTORY:  No pertinent family history in first degree relatives      Vital Signs Last 24 Hrs  T(C): 36.2 (28 Dec 2019 17:21), Max: 37.7 (27 Dec 2019 21:20)  T(F): 97.1 (28 Dec 2019 17:21), Max: 99.8 (27 Dec 2019 21:20)  HR: 58 (28 Dec 2019 17:21) (58 - 77)  BP: 141/94 (28 Dec 2019 17:21) (118/59 - 143/76)  BP(mean): --  RR: 17 (28 Dec 2019 17:21) (16 - 18)  SpO2: 95% (28 Dec 2019 17:21) (95% - 96%)    PHYSICAL EXAM:    GENERAL: NAD  HEAD:  Atraumatic, Normocephalic  EYES: EOMI, PERRLA, conjunctiva and sclera clear  ENMT: No tonsillar erythema, exudates, or enlargement  NECK: Supple, No JVD  NERVOUS SYSTEM:  Alert & Oriented X3, no focal neuro deficits   CHEST/LUNG: Clear to percussion bilaterally; No rales, rhonchi, wheezing, or rubs  HEART: Regular rate and rhythm; No murmurs, rubs, or gallops  ABDOMEN: Soft, Nontender, Nondistended; Bowel sounds present  Lower abd/buttock: dressing c/d/i   EXTREMITIES:  2+ Peripheral Pulses b/l, No clubbing, cyanosis, or edema b/l. no calf tenderness b/l.         Labs and imaging reviewed.                         10.9   9.97  )-----------( 260      ( 28 Dec 2019 07:07 )             32.5         139  |  106  |  35<H>  ----------------------------<  238<H>  3.9   |  20<L>  |  1.48<H>    Ca    8.7      28 Dec 2019 07:07    TPro  6.8  /  Alb  3.0<L>  /  TBili  0.4  /  DBili  x   /  AST  11<L>  /  ALT  13  /  AlkPhos  95  12-    PT/INR - ( 27 Dec 2019 20:15 )   PT: 12.2 sec;   INR: 1.09 ratio         PTT - ( 27 Dec 2019 20:15 )  PTT:30.0 sec      Hemoglobin A1C, Whole Blood: 7.3: Method: Immunoassay       Reference Range                4.0-5.6%       High risk (prediabetic)        5.7-6.4%       Diabetic, diagnostic             >=6.5%       ADA diabetic treatment goal       <7.0%  The Hemoglobin A1c testing is NGSP-certified.Reference ranges are based  upon the 2010 recommendations of  the American Diabetes Association.  Interpretation may vary for children  and adolescents. % (19 @ 11:51)        Urinalysis Basic - ( 27 Dec 2019 22:53 )    Color: Yellow / Appearance: Clear / S.010 / pH: x  Gluc: x / Ketone: Negative  / Bili: Negative / Urobili: Negative mg/dL   Blood: x / Protein: 100 mg/dL / Nitrite: Negative   Leuk Esterase: Negative / RBC: 0-2 /HPF / WBC 0-2   Sq Epi: x / Non Sq Epi: Few / Bacteria: Occasional        RADIOLOGY & ADDITIONAL STUDIES:  < from: Xray Chest 1 View- PORTABLE-Urgent (19 @ 22:25) >  IMPRESSION: Clear lungs.    < end of copied text >    Consultant(s) Notes Reveiwed [ ] Yes     Care Discussed with [x ] Consultants  [ x] Patient  [ ] Family  [x ] /   [ x] Other; RN    Care discussed in detail with patient.  All questions and concerns addressed

## 2019-12-28 NOTE — CONSULT NOTE ADULT - ASSESSMENT
78 y/o female PMHx GERD, DM, TIA, CVA, CAD, HTN, MI, open cholecystectomy, wrist/shoulder/ankle surgeries, excision of pilonidal cyst 12/18/19 c/o pain at her wound x 4 days.     Assessment and Plan:     #surgical site infection, s/p I&D 12/27 at bedside by surgery   S/p excision of pilonidal cyst 12/18/19  -IVF   -c/w zosyn   -pain control per surgery team   -c/w local wound care per surgical team     #Leukocytosis, sec to above   resolved     #DM2, A1c 7.3%   -c/w Lantus 14u at bedtime, premeal lispro 4u, ISS  -carb controlled diet  -FS goal 140-180, preferred <160 for better wound healing   -resume home meds on discharge     #Essential HTN, resume home meds     #HLD, resume rosuvastatin 10mg at bedtime on discharge     #h/o CVA -resume home meds     #CAD-c/w brilinta/home meds     #Presumed CKD 3 , unknown baseline   Cr stable   avoid nephrotic meds, dose all meds based on eGFR  monitor Cr, if worsening recommend renal US     #Preventative measures   -dvt ppx per surgical team, recommend heparin or lovenox SQ   -fall precautions

## 2019-12-28 NOTE — PROGRESS NOTE ADULT - SUBJECTIVE AND OBJECTIVE BOX
SURGERY PROGRESS HPI:  Pt seen and examined at bedside. Pain is well controlled on pain medication. Pt denies complaints. No acute events overnight. Pt tolerating diet. Pt denies nausea and vomiting. Voiding well. Pt denies chest pain, SOB, dizziness, fever, chills. Ambulating.    Vital Signs Last 24 Hrs  T(C): 37 (27 Dec 2019 22:30), Max: 37.7 (27 Dec 2019 21:20)  T(F): 98.6 (27 Dec 2019 22:30), Max: 99.8 (27 Dec 2019 21:20)  HR: 77 (27 Dec 2019 22:30) (70 - 77)  BP: 143/62 (27 Dec 2019 22:30) (138/59 - 143/76)  BP(mean): --  RR: 16 (27 Dec 2019 22:30) (16 - 18)  SpO2: 95% (27 Dec 2019 22:30) (95% - 95%)      PHYSICAL EXAM:    GENERAL: NAD  CHEST/LUNG: Clear to ausculation, bilaterally   HEART: RRR S1S2  ABDOMEN: non distended, +BS, soft, non tender, no guarding  LOWER BACK/BUTTOCK: Outer dressing clean/dry/intact, inner dressing with serosanguinous stain. Packing removed from wound from pilonidal cyst excision site. Small amount of murky fluid expressed. Wound irrigated with NS. New iodoform packing and dressing placed. Patient tolerated well.   EXTREMITIES:  calf soft, non tender b/l    I&O's Detail    27 Dec 2019 07:01  -  28 Dec 2019 05:47  --------------------------------------------------------  IN:    Solution: 100 mL  Total IN: 100 mL    OUT:  Total OUT: 0 mL    Total NET: 100 mL      LABS:                        11.0   14.76 )-----------( 263      ( 27 Dec 2019 20:15 )             32.3         136  |  107  |  37<H>  ----------------------------<  240<H>  4.3   |  17<L>  |  1.61<H>    Ca    8.6      27 Dec 2019 20:15    TPro  6.8  /  Alb  3.0<L>  /  TBili  0.4  /  DBili  x   /  AST  11<L>  /  ALT  13  /  AlkPhos  95      PT/INR - ( 27 Dec 2019 20:15 )   PT: 12.2 sec;   INR: 1.09 ratio    PTT - ( 27 Dec 2019 20:15 )  PTT:30.0 sec  Urinalysis Basic - ( 27 Dec 2019 22:53 )    Color: Yellow / Appearance: Clear / S.010 / pH: x  Gluc: x / Ketone: Negative  / Bili: Negative / Urobili: Negative mg/dL   Blood: x / Protein: 100 mg/dL / Nitrite: Negative   Leuk Esterase: Negative / RBC: 0-2 /HPF / WBC 0-2   Sq Epi: x / Non Sq Epi: Few / Bacteria: Occasional      Assessment: 78 y/o female PMHx GERD, DM, TIA, CVA, CAD, HTN, MI, open cholecystectomy, wrist/shoulder/ankle surgeries, excision of pilonidal cyst 19 c/o pain at her wound x 4 days. S/p I&D  at bedside.    Plan:  -pain management prn  -continue DVT prophylaxis, OOB, Ambulating  -continue local wound care with iodoform packing  -continue Zosyn  -f/u labs  -will discuss pt with surgical attending

## 2019-12-29 ENCOUNTER — TRANSCRIPTION ENCOUNTER (OUTPATIENT)
Age: 79
End: 2019-12-29

## 2019-12-29 LAB
ANION GAP SERPL CALC-SCNC: 7 MMOL/L — SIGNIFICANT CHANGE UP (ref 5–17)
BUN SERPL-MCNC: 26 MG/DL — HIGH (ref 7–23)
CALCIUM SERPL-MCNC: 8.4 MG/DL — LOW (ref 8.5–10.1)
CHLORIDE SERPL-SCNC: 107 MMOL/L — SIGNIFICANT CHANGE UP (ref 96–108)
CO2 SERPL-SCNC: 25 MMOL/L — SIGNIFICANT CHANGE UP (ref 22–31)
CREAT SERPL-MCNC: 1.39 MG/DL — HIGH (ref 0.5–1.3)
CULTURE RESULTS: SIGNIFICANT CHANGE UP
FERRITIN SERPL-MCNC: 513 NG/ML — HIGH (ref 15–150)
FOLATE SERPL-MCNC: 5.8 NG/ML — SIGNIFICANT CHANGE UP
GLUCOSE SERPL-MCNC: 271 MG/DL — HIGH (ref 70–99)
HCT VFR BLD CALC: 30 % — LOW (ref 34.5–45)
HGB BLD-MCNC: 9.6 G/DL — LOW (ref 11.5–15.5)
IRON SATN MFR SERPL: 16 % — SIGNIFICANT CHANGE UP (ref 14–50)
IRON SATN MFR SERPL: 35 UG/DL — SIGNIFICANT CHANGE UP (ref 30–160)
MCHC RBC-ENTMCNC: 28.2 PG — SIGNIFICANT CHANGE UP (ref 27–34)
MCHC RBC-ENTMCNC: 32 GM/DL — SIGNIFICANT CHANGE UP (ref 32–36)
MCV RBC AUTO: 88.2 FL — SIGNIFICANT CHANGE UP (ref 80–100)
NRBC # BLD: 0 /100 WBCS — SIGNIFICANT CHANGE UP (ref 0–0)
PLATELET # BLD AUTO: 267 K/UL — SIGNIFICANT CHANGE UP (ref 150–400)
POTASSIUM SERPL-MCNC: 4.2 MMOL/L — SIGNIFICANT CHANGE UP (ref 3.5–5.3)
POTASSIUM SERPL-SCNC: 4.2 MMOL/L — SIGNIFICANT CHANGE UP (ref 3.5–5.3)
RBC # BLD: 3.4 M/UL — LOW (ref 3.8–5.2)
RBC # FLD: 13.1 % — SIGNIFICANT CHANGE UP (ref 10.3–14.5)
SODIUM SERPL-SCNC: 139 MMOL/L — SIGNIFICANT CHANGE UP (ref 135–145)
SPECIMEN SOURCE: SIGNIFICANT CHANGE UP
TIBC SERPL-MCNC: 222 UG/DL — SIGNIFICANT CHANGE UP (ref 220–430)
UIBC SERPL-MCNC: 187 UG/DL — SIGNIFICANT CHANGE UP (ref 110–370)
VIT B12 SERPL-MCNC: 513 PG/ML — SIGNIFICANT CHANGE UP (ref 232–1245)
WBC # BLD: 9.85 K/UL — SIGNIFICANT CHANGE UP (ref 3.8–10.5)
WBC # FLD AUTO: 9.85 K/UL — SIGNIFICANT CHANGE UP (ref 3.8–10.5)

## 2019-12-29 PROCEDURE — 99231 SBSQ HOSP IP/OBS SF/LOW 25: CPT

## 2019-12-29 PROCEDURE — 99233 SBSQ HOSP IP/OBS HIGH 50: CPT

## 2019-12-29 RX ORDER — INSULIN LISPRO 100/ML
5 VIAL (ML) SUBCUTANEOUS
Refills: 0 | Status: DISCONTINUED | OUTPATIENT
Start: 2019-12-29 | End: 2019-12-30

## 2019-12-29 RX ADMIN — CARVEDILOL PHOSPHATE 6.25 MILLIGRAM(S): 80 CAPSULE, EXTENDED RELEASE ORAL at 17:08

## 2019-12-29 RX ADMIN — SODIUM CHLORIDE 100 MILLILITER(S): 9 INJECTION, SOLUTION INTRAVENOUS at 05:28

## 2019-12-29 RX ADMIN — Medication 4 UNIT(S): at 07:54

## 2019-12-29 RX ADMIN — Medication 5 UNIT(S): at 12:47

## 2019-12-29 RX ADMIN — TICAGRELOR 90 MILLIGRAM(S): 90 TABLET ORAL at 17:08

## 2019-12-29 RX ADMIN — PIPERACILLIN AND TAZOBACTAM 25 GRAM(S): 4; .5 INJECTION, POWDER, LYOPHILIZED, FOR SOLUTION INTRAVENOUS at 22:24

## 2019-12-29 RX ADMIN — ALBUTEROL 2.5 MILLIGRAM(S): 90 AEROSOL, METERED ORAL at 22:58

## 2019-12-29 RX ADMIN — OXYCODONE HYDROCHLORIDE 5 MILLIGRAM(S): 5 TABLET ORAL at 16:20

## 2019-12-29 RX ADMIN — SODIUM CHLORIDE 100 MILLILITER(S): 9 INJECTION, SOLUTION INTRAVENOUS at 17:52

## 2019-12-29 RX ADMIN — Medication 5 UNIT(S): at 17:08

## 2019-12-29 RX ADMIN — PIPERACILLIN AND TAZOBACTAM 25 GRAM(S): 4; .5 INJECTION, POWDER, LYOPHILIZED, FOR SOLUTION INTRAVENOUS at 13:36

## 2019-12-29 RX ADMIN — OXYCODONE HYDROCHLORIDE 10 MILLIGRAM(S): 5 TABLET ORAL at 13:47

## 2019-12-29 RX ADMIN — OXYCODONE HYDROCHLORIDE 10 MILLIGRAM(S): 5 TABLET ORAL at 12:47

## 2019-12-29 RX ADMIN — Medication 2: at 07:54

## 2019-12-29 RX ADMIN — OXYCODONE HYDROCHLORIDE 10 MILLIGRAM(S): 5 TABLET ORAL at 03:11

## 2019-12-29 RX ADMIN — CITALOPRAM 20 MILLIGRAM(S): 10 TABLET, FILM COATED ORAL at 11:03

## 2019-12-29 RX ADMIN — AMLODIPINE BESYLATE 10 MILLIGRAM(S): 2.5 TABLET ORAL at 05:28

## 2019-12-29 RX ADMIN — PIPERACILLIN AND TAZOBACTAM 25 GRAM(S): 4; .5 INJECTION, POWDER, LYOPHILIZED, FOR SOLUTION INTRAVENOUS at 05:28

## 2019-12-29 RX ADMIN — Medication 1: at 11:02

## 2019-12-29 RX ADMIN — PANTOPRAZOLE SODIUM 40 MILLIGRAM(S): 20 TABLET, DELAYED RELEASE ORAL at 07:53

## 2019-12-29 RX ADMIN — Medication 1: at 17:08

## 2019-12-29 RX ADMIN — INSULIN GLARGINE 14 UNIT(S): 100 INJECTION, SOLUTION SUBCUTANEOUS at 22:24

## 2019-12-29 RX ADMIN — OXYCODONE HYDROCHLORIDE 10 MILLIGRAM(S): 5 TABLET ORAL at 04:11

## 2019-12-29 RX ADMIN — CARVEDILOL PHOSPHATE 6.25 MILLIGRAM(S): 80 CAPSULE, EXTENDED RELEASE ORAL at 05:28

## 2019-12-29 RX ADMIN — OXYCODONE HYDROCHLORIDE 5 MILLIGRAM(S): 5 TABLET ORAL at 15:20

## 2019-12-29 RX ADMIN — TICAGRELOR 90 MILLIGRAM(S): 90 TABLET ORAL at 05:28

## 2019-12-29 NOTE — PROGRESS NOTE ADULT - ASSESSMENT
80 y/o female PMHx GERD, DM, TIA, CVA, CAD, HTN, MI, open cholecystectomy, wrist/shoulder/ankle surgeries, excision of pilonidal cyst 12/18/19 c/o pain at her wound x 4 days.     Assessment and Plan:     #surgical site infection, s/p I&D 12/27 at bedside by surgery   S/p excision of pilonidal cyst 12/18/19  -IVF   -c/w zosyn   -pain control per surgery team   -c/w local wound care per surgical team     #Leukocytosis, sec to above   resolved     #Normocytic anemia  no e/o active bleed   hgb trending down   follow anemia panel   monitor H/H   transfuse prn if Hgb <7 or if patient symptomatic       #DM2, A1c 7.3%   -c/w Lantus 14u at bedtime, premeal lispro increased to 5u, ISS  -carb controlled diet  -FS goal 140-180, preferred <160 for better wound healing   -resume home meds on discharge     #Essential HTN, resume home meds     #HLD, resume rosuvastatin 10mg at bedtime on discharge     #h/o CVA -resume home meds     #CAD-c/w brilinta/home meds     #Presumed CKD 3 , unknown baseline   Cr stable   avoid nephrotic meds, dose all meds based on eGFR  monitor Cr, if worsening recommend renal US     #Preventative measures   -dvt ppx per surgical team  -fall precautions

## 2019-12-29 NOTE — PROGRESS NOTE ADULT - SUBJECTIVE AND OBJECTIVE BOX
SURGERY PROGRESS HPI:  Pt seen and examined at bedside. Pain is well controlled on pain medication. Pt denies complaints. No acute events overnight. Pt tolerating diet. Pt denies nausea and vomiting. Voiding well. Pt denies chest pain, SOB, dizziness, fever, chills. Ambulating.    Vital Signs Last 24 Hrs  T(C): 36.5 (29 Dec 2019 05:35), Max: 36.6 (28 Dec 2019 11:23)  T(F): 97.7 (29 Dec 2019 05:35), Max: 97.8 (28 Dec 2019 11:23)  HR: 64 (29 Dec 2019 05:35) (58 - 65)  BP: 140/54 (29 Dec 2019 05:35) (118/59 - 141/94)  BP(mean): --  RR: 17 (29 Dec 2019 05:35) (16 - 17)  SpO2: 96% (29 Dec 2019 05:35) (95% - 96%)      PHYSICAL EXAM:  GENERAL: NAD  CHEST/LUNG: Clear to ausculation, bilaterally   HEART: RRR S1S2  ABDOMEN: non distended, +BS, soft, non tender, no guarding  LOWER BACK/BUTTOCK: Outer dressing clean/dry/intact, inner dressing with serous stain. Packing removed from wound from pilonidal cyst excision site. Small amount of serous fluid expressed. Wound irrigated with NS. New iodoform packing and dressing placed. Patient tolerated well.   EXTREMITIES:  calf soft, non tender b/l    I&O's Detail    27 Dec 2019 07:01  -  28 Dec 2019 07:00  --------------------------------------------------------  IN:    Solution: 100 mL  Total IN: 100 mL    OUT:  Total OUT: 0 mL    Total NET: 100 mL      28 Dec 2019 07:  -  29 Dec 2019 06:13  --------------------------------------------------------  IN:    Oral Fluid: 760 mL  Total IN: 760 mL    OUT:  Total OUT: 0 mL    Total NET: 760 mL      LABS:                        10.9   9.97  )-----------( 260      ( 28 Dec 2019 07:07 )             32.5         139  |  106  |  35<H>  ----------------------------<  238<H>  3.9   |  20<L>  |  1.48<H>    Ca    8.7      28 Dec 2019 07:07    TPro  6.8  /  Alb  3.0<L>  /  TBili  0.4  /  DBili  x   /  AST  11<L>  /  ALT  13  /  AlkPhos  95      PT/INR - ( 27 Dec 2019 20:15 )   PT: 12.2 sec;   INR: 1.09 ratio         PTT - ( 27 Dec 2019 20:15 )  PTT:30.0 sec  Urinalysis Basic - ( 27 Dec 2019 22:53 )    Color: Yellow / Appearance: Clear / S.010 / pH: x  Gluc: x / Ketone: Negative  / Bili: Negative / Urobili: Negative mg/dL   Blood: x / Protein: 100 mg/dL / Nitrite: Negative   Leuk Esterase: Negative / RBC: 0-2 /HPF / WBC 0-2   Sq Epi: x / Non Sq Epi: Few / Bacteria: Occasional      Assessment: 80 y/o female PMHx GERD, DM, TIA, CVA, CAD, HTN, MI, open cholecystectomy, wrist/shoulder/ankle surgeries, excision of pilonidal cyst 19 c/o pain at her wound x 4 days. S/p I&D  at bedside.    Plan:  -pain management prn  -continue DVT prophylaxis, OOB, Ambulating  -continue local wound care with iodoform packing  -continue Zosyn  -f/u labs  -continue medical comanagement  -will discuss pt with surgical attending

## 2019-12-29 NOTE — PROGRESS NOTE ADULT - SUBJECTIVE AND OBJECTIVE BOX
Patient is a 79y old  Female who presents with a chief complaint of Pain s/p excision of pilonidal cyst (29 Dec 2019 06:12)        HPI:  80 y/o female PMHx GERD, DM, TIA, CVA, CAD, HTN, MI, open cholecystectomy, wrist/shoulder/ankle surgeries, excision of pilonidal cyst 19 c/o pain at her wound x 4 days. Reports that she has been showering and changing the dressings. Pt lives at home alone. Normal eating habits. Normal BM/flatus and urination. Patient denies fever, chills, nausea, vomiting, constipation, diarrhea, dysuria, hematuria, melena, hematochezia, chest pain, shortness of breath, dizziness. (27 Dec 2019 20:48)      SUBJECTIVE & OBJECTIVE: Pt seen and examined at bedside.     PHYSICAL EXAM:  T(C): 36.5 (19 @ 05:35), Max: 36.6 (19 @ 11:23)  HR: 64 (19 @ 05:35) (58 - 64)  BP: 140/54 (19 @ 05:35) (128/53 - 141/94)  RR: 17 (19 @ 05:35) (16 - 17)  SpO2: 96% (19 @ 05:35) (95% - 96%)  Wt(kg): --   I&O's Detail    28 Dec 2019 07:01  -  29 Dec 2019 07:00  --------------------------------------------------------  IN:    lactated ringers.: 1200 mL    Oral Fluid: 760 mL  Total IN: 1960 mL    OUT:  Total OUT: 0 mL    Total NET: 1960 mL        GENERAL: NAD, well-groomed, well-developed  HEAD:  Atraumatic, Normocephalic  EYES: EOMI, PERRLA, conjunctiva and sclera clear  ENMT: Moist mucous membranes  NECK: Supple, No JVD  NERVOUS SYSTEM:  Alert & Oriented X3, Motor Strength 5/5 B/L upper and lower extremities; DTRs 2+ intact and symmetric  CHEST/LUNG: Clear to auscultation bilaterally; No rales, rhonchi, wheezing, or rubs  HEART: Regular rate and rhythm; No murmurs, rubs, or gallops  ABDOMEN: Soft, Nontender, Nondistended; Bowel sounds present  EXTREMITIES:  2+ Peripheral Pulses, No clubbing, cyanosis, or edema    MEDICATIONS  (STANDING):  amLODIPine   Tablet 10 milliGRAM(s) Oral daily  carvedilol 6.25 milliGRAM(s) Oral every 12 hours  citalopram 20 milliGRAM(s) Oral daily  dextrose 5%. 1000 milliLiter(s) (50 mL/Hr) IV Continuous <Continuous>  dextrose 50% Injectable 12.5 Gram(s) IV Push once  dextrose 50% Injectable 25 Gram(s) IV Push once  dextrose 50% Injectable 25 Gram(s) IV Push once  influenza   Vaccine 0.5 milliLiter(s) IntraMuscular once  insulin glargine Injectable (LANTUS) 14 Unit(s) SubCutaneous at bedtime  insulin lispro (HumaLOG) corrective regimen sliding scale   SubCutaneous three times a day before meals  insulin lispro Injectable (HumaLOG) 4 Unit(s) SubCutaneous before breakfast  insulin lispro Injectable (HumaLOG) 4 Unit(s) SubCutaneous before dinner  lactated ringers. 1000 milliLiter(s) (100 mL/Hr) IV Continuous <Continuous>  pantoprazole    Tablet 40 milliGRAM(s) Oral before breakfast  piperacillin/tazobactam IVPB.. 3.375 Gram(s) IV Intermittent every 8 hours  ticagrelor 90 milliGRAM(s) Oral every 12 hours    MEDICATIONS  (PRN):  acetaminophen   Tablet .. 650 milliGRAM(s) Oral every 6 hours PRN Temp greater or equal to 38C (100.4F), Mild Pain (1 - 3)  ALBUTerol    0.083% 2.5 milliGRAM(s) Nebulizer every 6 hours PRN Shortness of Breath and/or Wheezing  dextrose 40% Gel 15 Gram(s) Oral once PRN Blood Glucose LESS THAN 70 milliGRAM(s)/deciliter  glucagon  Injectable 1 milliGRAM(s) IntraMuscular once PRN Glucose LESS THAN 70 milligrams/deciliter  ondansetron Injectable 4 milliGRAM(s) IV Push every 6 hours PRN Nausea  oxyCODONE    IR 10 milliGRAM(s) Oral every 6 hours PRN Severe Pain (7 - 10)  oxyCODONE    IR 5 milliGRAM(s) Oral every 6 hours PRN Moderate Pain (4 - 6)      LABS:                        9.6    9.85  )-----------( 267      ( 29 Dec 2019 06:25 )             30.0         139  |  107  |  26<H>  ----------------------------<  271<H>  4.2   |  25  |  1.39<H>    Ca    8.4<L>      29 Dec 2019 06:25    TPro  6.8  /  Alb  3.0<L>  /  TBili  0.4  /  DBili  x   /  AST  11<L>  /  ALT  13  /  AlkPhos  95      PT/INR - ( 27 Dec 2019 20:15 )   PT: 12.2 sec;   INR: 1.09 ratio         PTT - ( 27 Dec 2019 20:15 )  PTT:30.0 sec  Urinalysis Basic - ( 27 Dec 2019 22:53 )    Color: Yellow / Appearance: Clear / S.010 / pH: x  Gluc: x / Ketone: Negative  / Bili: Negative / Urobili: Negative mg/dL   Blood: x / Protein: 100 mg/dL / Nitrite: Negative   Leuk Esterase: Negative / RBC: 0-2 /HPF / WBC 0-2   Sq Epi: x / Non Sq Epi: Few / Bacteria: Occasional        CAPILLARY BLOOD GLUCOSE      POCT Blood Glucose.: 185 mg/dL (29 Dec 2019 10:53)  POCT Blood Glucose.: 230 mg/dL (29 Dec 2019 07:44)  POCT Blood Glucose.: 235 mg/dL (28 Dec 2019 21:15)  POCT Blood Glucose.: 255 mg/dL (28 Dec 2019 16:57)            RECENT CULTURES:  Urine culture:   @ 11:28 --   <10,000 CFU/mL Normal Urogenital Karmen      RADIOLOGY & ADDITIONAL TESTS:  Imaging Personally Reviewed:  [ ] YES  [ ] NO    Consultant(s) Notes Reviewed:  [ ] YES  [ ] NO    Care Discussed with Consultants/Other Providers [ ] YES  [ ] NO HPI:  78 y/o female PMHx GERD, DM, TIA, CVA, CAD, HTN, MI, open cholecystectomy, wrist/shoulder/ankle surgeries, excision of pilonidal cyst 19 c/o pain at her wound x 4 days. Reports that she has been showering and changing the dressings. Pt lives at home alone. Normal eating habits. Normal BM/flatus and urination. Patient denies fever, chills, nausea, vomiting, constipation, diarrhea, dysuria, hematuria, melena, hematochezia, chest pain, shortness of breath, dizziness. (27 Dec 2019 20:48)      SUBJECTIVE & OBJECTIVE: Pt seen and examined at bedside.   No complaints.     PHYSICAL EXAM:  T(C): 36.5 (19 @ 05:35), Max: 36.6 (19 @ 11:23)  HR: 64 (19 @ 05:35) (58 - 64)  BP: 140/54 (19 @ 05:35) (128/53 - 141/94)  RR: 17 (19 @ 05:35) (16 - 17)  SpO2: 96% (19 @ 05:35) (95% - 96%)  Wt(kg): --   I&O's Detail    28 Dec 2019 07:01  -  29 Dec 2019 07:00  --------------------------------------------------------  IN:    lactated ringers.: 1200 mL    Oral Fluid: 760 mL  Total IN: 1960 mL    OUT:  Total OUT: 0 mL    Total NET: 1960 mL      GENERAL: NAD  HEAD:  Atraumatic, Normocephalic  EYES: EOMI, PERRLA, conjunctiva and sclera clear  ENMT: No tonsillar erythema, exudates, or enlargement  NECK: Supple, No JVD  NERVOUS SYSTEM:  Alert & Oriented X3, no focal neuro deficits   CHEST/LUNG: Clear to percussion bilaterally; No rales, rhonchi, wheezing, or rubs  HEART: Regular rate and rhythm; No murmurs, rubs, or gallops  ABDOMEN: Soft, Nontender, Nondistended; Bowel sounds present  Lower abd/buttock: dressing c/d/i   EXTREMITIES:  2+ Peripheral Pulses b/l, No clubbing, cyanosis, or edema b/l. no calf tenderness b/l.       MEDICATIONS  (STANDING):  amLODIPine   Tablet 10 milliGRAM(s) Oral daily  carvedilol 6.25 milliGRAM(s) Oral every 12 hours  citalopram 20 milliGRAM(s) Oral daily  dextrose 5%. 1000 milliLiter(s) (50 mL/Hr) IV Continuous <Continuous>  dextrose 50% Injectable 12.5 Gram(s) IV Push once  dextrose 50% Injectable 25 Gram(s) IV Push once  dextrose 50% Injectable 25 Gram(s) IV Push once  influenza   Vaccine 0.5 milliLiter(s) IntraMuscular once  insulin glargine Injectable (LANTUS) 14 Unit(s) SubCutaneous at bedtime  insulin lispro (HumaLOG) corrective regimen sliding scale   SubCutaneous three times a day before meals  insulin lispro Injectable (HumaLOG) 4 Unit(s) SubCutaneous before breakfast  insulin lispro Injectable (HumaLOG) 4 Unit(s) SubCutaneous before dinner  lactated ringers. 1000 milliLiter(s) (100 mL/Hr) IV Continuous <Continuous>  pantoprazole    Tablet 40 milliGRAM(s) Oral before breakfast  piperacillin/tazobactam IVPB.. 3.375 Gram(s) IV Intermittent every 8 hours  ticagrelor 90 milliGRAM(s) Oral every 12 hours    MEDICATIONS  (PRN):  acetaminophen   Tablet .. 650 milliGRAM(s) Oral every 6 hours PRN Temp greater or equal to 38C (100.4F), Mild Pain (1 - 3)  ALBUTerol    0.083% 2.5 milliGRAM(s) Nebulizer every 6 hours PRN Shortness of Breath and/or Wheezing  dextrose 40% Gel 15 Gram(s) Oral once PRN Blood Glucose LESS THAN 70 milliGRAM(s)/deciliter  glucagon  Injectable 1 milliGRAM(s) IntraMuscular once PRN Glucose LESS THAN 70 milligrams/deciliter  ondansetron Injectable 4 milliGRAM(s) IV Push every 6 hours PRN Nausea  oxyCODONE    IR 10 milliGRAM(s) Oral every 6 hours PRN Severe Pain (7 - 10)  oxyCODONE    IR 5 milliGRAM(s) Oral every 6 hours PRN Moderate Pain (4 - 6)      Labs and imaging reviewed.                         9.6    9.85  )-----------( 267      ( 29 Dec 2019 06:25 )             30.0         139  |  107  |  26<H>  ----------------------------<  271<H>  4.2   |  25  |  1.39<H>    Ca    8.4<L>      29 Dec 2019 06:25    TPro  6.8  /  Alb  3.0<L>  /  TBili  0.4  /  DBili  x   /  AST  11<L>  /  ALT  13  /  AlkPhos  95      PT/INR - ( 27 Dec 2019 20:15 )   PT: 12.2 sec;   INR: 1.09 ratio         PTT - ( 27 Dec 2019 20:15 )  PTT:30.0 sec  Urinalysis Basic - ( 27 Dec 2019 22:53 )    Color: Yellow / Appearance: Clear / S.010 / pH: x  Gluc: x / Ketone: Negative  / Bili: Negative / Urobili: Negative mg/dL   Blood: x / Protein: 100 mg/dL / Nitrite: Negative   Leuk Esterase: Negative / RBC: 0-2 /HPF / WBC 0-2   Sq Epi: x / Non Sq Epi: Few / Bacteria: Occasional        CAPILLARY BLOOD GLUCOSE      POCT Blood Glucose.: 185 mg/dL (29 Dec 2019 10:53)  POCT Blood Glucose.: 230 mg/dL (29 Dec 2019 07:44)  POCT Blood Glucose.: 235 mg/dL (28 Dec 2019 21:15)  POCT Blood Glucose.: 255 mg/dL (28 Dec 2019 16:57)            RECENT CULTURES:  Urine culture:   @ 11:28 --   <10,000 CFU/mL Normal Urogenital Karmen      RADIOLOGY & ADDITIONAL TESTS:  Imaging Personally Reviewed:  [ ] YES  [ ] NO    Consultant(s) Notes Reviewed:  [ ] YES  [ ] NO    Care Discussed with Consultants/Other Providers [ ] YES  [ ] NO

## 2019-12-30 ENCOUNTER — TRANSCRIPTION ENCOUNTER (OUTPATIENT)
Age: 79
End: 2019-12-30

## 2019-12-30 ENCOUNTER — APPOINTMENT (OUTPATIENT)
Dept: SURGERY | Facility: CLINIC | Age: 79
End: 2019-12-30

## 2019-12-30 VITALS
TEMPERATURE: 98 F | HEART RATE: 74 BPM | DIASTOLIC BLOOD PRESSURE: 60 MMHG | OXYGEN SATURATION: 95 % | RESPIRATION RATE: 17 BRPM | SYSTOLIC BLOOD PRESSURE: 169 MMHG

## 2019-12-30 LAB
ALBUMIN SERPL ELPH-MCNC: 2.6 G/DL — LOW (ref 3.3–5)
ALP SERPL-CCNC: 103 U/L — SIGNIFICANT CHANGE UP (ref 40–120)
ALT FLD-CCNC: 21 U/L — SIGNIFICANT CHANGE UP (ref 12–78)
AST SERPL-CCNC: 12 U/L — LOW (ref 15–37)
BILIRUB DIRECT SERPL-MCNC: 0.08 MG/DL — SIGNIFICANT CHANGE UP (ref 0.05–0.2)
BILIRUB INDIRECT FLD-MCNC: 0.1 MG/DL — LOW (ref 0.2–1)
BILIRUB SERPL-MCNC: 0.2 MG/DL — SIGNIFICANT CHANGE UP (ref 0.2–1.2)
HCT VFR BLD CALC: 30.4 % — LOW (ref 34.5–45)
HGB BLD-MCNC: 9.9 G/DL — LOW (ref 11.5–15.5)
MCHC RBC-ENTMCNC: 28.2 PG — SIGNIFICANT CHANGE UP (ref 27–34)
MCHC RBC-ENTMCNC: 32.6 GM/DL — SIGNIFICANT CHANGE UP (ref 32–36)
MCV RBC AUTO: 86.6 FL — SIGNIFICANT CHANGE UP (ref 80–100)
NRBC # BLD: 0 /100 WBCS — SIGNIFICANT CHANGE UP (ref 0–0)
PLATELET # BLD AUTO: 276 K/UL — SIGNIFICANT CHANGE UP (ref 150–400)
PROT SERPL-MCNC: 6.1 GM/DL — SIGNIFICANT CHANGE UP (ref 6–8.3)
RBC # BLD: 3.51 M/UL — LOW (ref 3.8–5.2)
RBC # FLD: 13 % — SIGNIFICANT CHANGE UP (ref 10.3–14.5)
WBC # BLD: 7.54 K/UL — SIGNIFICANT CHANGE UP (ref 3.8–10.5)
WBC # FLD AUTO: 7.54 K/UL — SIGNIFICANT CHANGE UP (ref 3.8–10.5)

## 2019-12-30 PROCEDURE — 99238 HOSP IP/OBS DSCHRG MGMT 30/<: CPT

## 2019-12-30 PROCEDURE — 99233 SBSQ HOSP IP/OBS HIGH 50: CPT

## 2019-12-30 RX ORDER — OXYCODONE HYDROCHLORIDE 5 MG/1
2 TABLET ORAL
Qty: 20 | Refills: 0
Start: 2019-12-30

## 2019-12-30 RX ORDER — CITALOPRAM 10 MG/1
1 TABLET, FILM COATED ORAL
Qty: 0 | Refills: 0 | DISCHARGE

## 2019-12-30 RX ORDER — CITALOPRAM 10 MG/1
1 TABLET, FILM COATED ORAL
Qty: 0 | Refills: 0 | DISCHARGE
Start: 2019-12-30

## 2019-12-30 RX ORDER — INSULIN LISPRO 100/ML
7 VIAL (ML) SUBCUTANEOUS
Refills: 0 | Status: DISCONTINUED | OUTPATIENT
Start: 2019-12-30 | End: 2019-12-30

## 2019-12-30 RX ADMIN — Medication 1: at 13:22

## 2019-12-30 RX ADMIN — Medication 5 UNIT(S): at 09:54

## 2019-12-30 RX ADMIN — Medication 3: at 09:53

## 2019-12-30 RX ADMIN — SODIUM CHLORIDE 100 MILLILITER(S): 9 INJECTION, SOLUTION INTRAVENOUS at 06:17

## 2019-12-30 RX ADMIN — PANTOPRAZOLE SODIUM 40 MILLIGRAM(S): 20 TABLET, DELAYED RELEASE ORAL at 09:36

## 2019-12-30 RX ADMIN — Medication 5 UNIT(S): at 13:22

## 2019-12-30 RX ADMIN — PIPERACILLIN AND TAZOBACTAM 25 GRAM(S): 4; .5 INJECTION, POWDER, LYOPHILIZED, FOR SOLUTION INTRAVENOUS at 09:58

## 2019-12-30 RX ADMIN — AMLODIPINE BESYLATE 10 MILLIGRAM(S): 2.5 TABLET ORAL at 09:36

## 2019-12-30 RX ADMIN — CITALOPRAM 20 MILLIGRAM(S): 10 TABLET, FILM COATED ORAL at 11:46

## 2019-12-30 RX ADMIN — INFLUENZA VIRUS VACCINE 0.5 MILLILITER(S): 15; 15; 15; 15 SUSPENSION INTRAMUSCULAR at 14:01

## 2019-12-30 RX ADMIN — TICAGRELOR 90 MILLIGRAM(S): 90 TABLET ORAL at 09:36

## 2019-12-30 RX ADMIN — CARVEDILOL PHOSPHATE 6.25 MILLIGRAM(S): 80 CAPSULE, EXTENDED RELEASE ORAL at 09:36

## 2019-12-30 NOTE — PROGRESS NOTE ADULT - REASON FOR ADMISSION
Pain s/p excision of pilonidal cyst

## 2019-12-30 NOTE — DISCHARGE NOTE PROVIDER - CARE PROVIDER_API CALL
Vince Acosta)  Surgery  [address] [place] [address]  Phone: [phone]  Fax: [phone]  Follow Up Time:

## 2019-12-30 NOTE — DISCHARGE NOTE PROVIDER - NSDCMRMEDTOKEN_GEN_ALL_CORE_FT
amLODIPine 10 mg oral tablet: 1 tab(s) orally once a day  Brilinta (ticagrelor) 90 mg oral tablet: 1 tab(s) orally 2 times a day  carvedilol 6.25 mg oral tablet: 1 tab(s) orally 2 times a day  citalopram 20 mg oral tablet: 1 tab(s) orally once a day  Janumet 50 mg-500 mg oral tablet: 1 tab(s) orally once a day  Jardiance 10 mg oral tablet: 1 tab(s) orally once a day (in the morning)  levimir: 14 unit(s) subcutaneous once a day  NovoLO dose(s) subcutaneous 3 times a day  omeprazole 20 mg oral delayed release capsule: 1 cap(s) orally once a day  rosuvastatin 10 mg oral tablet: 1 tab(s) orally once a day amLODIPine 10 mg oral tablet: 1 tab(s) orally once a day  Brilinta (ticagrelor) 90 mg oral tablet: 1 tab(s) orally 2 times a day  carvedilol 6.25 mg oral tablet: 1 tab(s) orally 2 times a day  citalopram 20 mg oral tablet: 1 tab(s) orally once a day  Janumet 50 mg-500 mg oral tablet: 1 tab(s) orally once a day  Jardiance 10 mg oral tablet: 1 tab(s) orally once a day (in the morning)  levimir: 14 unit(s) subcutaneous once a day  NovoLO dose(s) subcutaneous 3 times a day  omeprazole 20 mg oral delayed release capsule: 1 cap(s) orally once a day  oxyCODONE 5 mg oral tablet: 2 tab(s) orally every 6 hours MDD:6   rosuvastatin 10 mg oral tablet: 1 tab(s) orally once a day

## 2019-12-30 NOTE — PROGRESS NOTE ADULT - SUBJECTIVE AND OBJECTIVE BOX
Patient is a 79y old  Female who presents with a chief complaint of Pain s/p excision of pilonidal cyst (29 Dec 2019 06:12)        HPI:  78 y/o female PMHx GERD, DM, TIA, CVA, CAD, HTN, MI, open cholecystectomy, wrist/shoulder/ankle surgeries, excision of pilonidal cyst 19 c/o pain at her wound x 4 days. Reports that she has been showering and changing the dressings. Pt lives at home alone. Normal eating habits. Normal BM/flatus and urination. Patient denies fever, chills, nausea, vomiting, constipation, diarrhea, dysuria, hematuria, melena, hematochezia, chest pain, shortness of breath, dizziness. (27 Dec 2019 20:48)      SUBJECTIVE & OBJECTIVE: Pt seen and examined at bedside.   No complaints.        PHYSICAL EXAM:  Vital Signs Last 24 Hrs  T(C): 36.8 (30 Dec 2019 11:48), Max: 37.1 (29 Dec 2019 23:28)  T(F): 98.3 (30 Dec 2019 11:48), Max: 98.8 (29 Dec 2019 23:28)  HR: 74 (30 Dec 2019 11:48) (61 - 74)  BP: 169/60 (30 Dec 2019 11:48) (133/63 - 169/60)  BP(mean): --  RR: 17 (30 Dec 2019 11:48) (17 - 17)  SpO2: 95% (30 Dec 2019 11:48) (94% - 98%)      GENERAL: NAD  HEAD:  Atraumatic, Normocephalic  EYES: EOMI, PERRLA, conjunctiva and sclera clear  ENMT: No tonsillar erythema, exudates, or enlargement  NECK: Supple, No JVD  NERVOUS SYSTEM:  Alert & Oriented X3, no focal neuro deficits   CHEST/LUNG: Clear to percussion bilaterally; No rales, rhonchi, wheezing, or rubs  HEART: Regular rate and rhythm; No murmurs, rubs, or gallops  ABDOMEN: Soft, Nontender, Nondistended; Bowel sounds present  Lower abd/buttock: dressing c/d/i   EXTREMITIES:  2+ Peripheral Pulses b/l, No clubbing, cyanosis, or edema b/l. no calf tenderness b/l.       MEDICATIONS  (STANDING):  amLODIPine   Tablet 10 milliGRAM(s) Oral daily  carvedilol 6.25 milliGRAM(s) Oral every 12 hours  citalopram 20 milliGRAM(s) Oral daily  dextrose 5%. 1000 milliLiter(s) (50 mL/Hr) IV Continuous <Continuous>  dextrose 50% Injectable 12.5 Gram(s) IV Push once  dextrose 50% Injectable 25 Gram(s) IV Push once  dextrose 50% Injectable 25 Gram(s) IV Push once  influenza   Vaccine 0.5 milliLiter(s) IntraMuscular once  insulin glargine Injectable (LANTUS) 14 Unit(s) SubCutaneous at bedtime  insulin lispro (HumaLOG) corrective regimen sliding scale   SubCutaneous three times a day before meals  insulin lispro Injectable (HumaLOG) 4 Unit(s) SubCutaneous before breakfast  insulin lispro Injectable (HumaLOG) 4 Unit(s) SubCutaneous before dinner  lactated ringers. 1000 milliLiter(s) (100 mL/Hr) IV Continuous <Continuous>  pantoprazole    Tablet 40 milliGRAM(s) Oral before breakfast  piperacillin/tazobactam IVPB.. 3.375 Gram(s) IV Intermittent every 8 hours  ticagrelor 90 milliGRAM(s) Oral every 12 hours    MEDICATIONS  (PRN):  acetaminophen   Tablet .. 650 milliGRAM(s) Oral every 6 hours PRN Temp greater or equal to 38C (100.4F), Mild Pain (1 - 3)  ALBUTerol    0.083% 2.5 milliGRAM(s) Nebulizer every 6 hours PRN Shortness of Breath and/or Wheezing  dextrose 40% Gel 15 Gram(s) Oral once PRN Blood Glucose LESS THAN 70 milliGRAM(s)/deciliter  glucagon  Injectable 1 milliGRAM(s) IntraMuscular once PRN Glucose LESS THAN 70 milligrams/deciliter  ondansetron Injectable 4 milliGRAM(s) IV Push every 6 hours PRN Nausea  oxyCODONE    IR 10 milliGRAM(s) Oral every 6 hours PRN Severe Pain (7 - 10)  oxyCODONE    IR 5 milliGRAM(s) Oral every 6 hours PRN Moderate Pain (4 - 6)      Labs and imaging reviewed.                                    9.9    7.54  )-----------( 276      ( 30 Dec 2019 06:59 )             30.4   12-    139  |  105  |  22  ----------------------------<  235<H>  4.3   |  23  |  1.36<H>    Ca    9.0      30 Dec 2019 06:59    TPro  6.1  /  Alb  2.6<L>  /  TBili  0.2  /  DBili  .08  /  AST  12<L>  /  ALT  21  /  AlkPhos  103  12-30    Urinalysis Basic - ( 27 Dec 2019 22:53 )    Color: Yellow / Appearance: Clear / S.010 / pH: x  Gluc: x / Ketone: Negative  / Bili: Negative / Urobili: Negative mg/dL   Blood: x / Protein: 100 mg/dL / Nitrite: Negative   Leuk Esterase: Negative / RBC: 0-2 /HPF / WBC 0-2   Sq Epi: x / Non Sq Epi: Few / Bacteria: Occasional    RECENT CULTURES:  Urine culture:   @ 11:28 --   <10,000 CFU/mL Normal Urogenital Karmen      RADIOLOGY & ADDITIONAL TESTS:  Imaging Personally Reviewed:  [x ] YES  [ ] NO    Consultant(s) Notes Reviewed:  [ x] YES  [ ] NO    Care Discussed with Consultants/Other Providers [x ] YES  [ ] NO    Care discussed in detail with patient.  All questions and concerns addressed

## 2019-12-30 NOTE — DISCHARGE NOTE PROVIDER - HOSPITAL COURSE
80 y/o female PMHx GERD, DM, TIA, CVA, CAD, HTN, MI, open cholecystectomy, wrist/shoulder/ankle surgeries, excision of pilonidal cyst 12/18/19 c/o pain at her wound x 4 days. S/p I&D 12/27 at bedside. Patient received IV antibiotics and had daily wound care.     At the time of discharge, the patient was hemodynamically stable, was tolerating PO diet, was voiding urine, was ambulating, and was comfortable with adequate pain control. VNS was set up. Patient instructed to follow up and to call the office to make an appointment.

## 2019-12-30 NOTE — PROGRESS NOTE ADULT - ASSESSMENT
78 y/o female PMHx GERD, DM, TIA, CVA, CAD, HTN, MI, open cholecystectomy, wrist/shoulder/ankle surgeries, excision of pilonidal cyst 12/18/19 c/o pain at her wound x 4 days.     Assessment and Plan:     #surgical site infection, s/p I&D 12/27 at bedside by surgery   S/p excision of pilonidal cyst 12/18/19  -IVF   -c/w zosyn   -pain control per surgery team   -c/w local wound care per surgical team     #Leukocytosis, sec to above   resolved     #Normocytic anemia, stable   no e/o active bleed   hgb trending down   -anemia panel appears to be iron deficiency anemia (ferr elevated, is acute phase reactant)  monitor H/H   transfuse prn if Hgb <7 or if patient symptomatic   supplement iron       #DM2, A1c 7.3%   -c/w Lantus 14u at bedtime, premeal lispro increased to 7u, ISS  -carb controlled diet  -FS goal 140-180, preferred <160 for better wound healing   -resume home meds on discharge     #Essential HTN, resume home meds     #HLD, resume rosuvastatin 10mg at bedtime on discharge     #h/o CVA -resume home meds     #CAD-c/w brilinta/home meds     #Presumed CKD 3 , unknown baseline   Cr stable   avoid nephrotic meds, dose all meds based on eGFR  monitor Cr, if worsening recommend renal US     #Preventative measures   -dvt ppx per surgical team  -fall precautions

## 2019-12-30 NOTE — DISCHARGE NOTE PROVIDER - NSDCFUSCHEDAPPT_GEN_ALL_CORE_FT
TONY CORREA ; 12/30/2019 ; NPP [facility] [address] TONY Saleem ; 01/22/2020 ; NPP Endocrin [address] TONY Saleem ; 02/03/2020 ; NPP Intmed [id number] [facility]  TONY CORREA ; 02/03/2020 ; NPP Cardio [id number] [facility] TONY CORREA ; 12/30/2019 ; NPP [place] [address] TONY Saleem ; 01/22/2020 ; NPP Endocrin [address] TONY Saleem ; 02/03/2020 ; NPP Intmed [id number] [facility]  TONY CORREA ; 02/03/2020 ; NPP Cardio [id number] [facility] TONY CORREA ; 12/30/2019 ; NPP [place] [address] TONY Saleem ; 01/22/2020 ; NPP Endocrin [address] TONY Saleem ; 02/03/2020 ; NPP Intmed [id number] [facility]  TONY CORREA ; 02/03/2020 ; NPP Cardio [id number] [facility] TONY CORREA ; 01/06/2020 ; NPP [place] [address] TONY Saleem ; 01/22/2020 ; NPP Endocrin [address] TONY Medina ; 02/03/2020 ; NPP Intmed [id number] [facility]  TONY CORREA ; 02/03/2020 ; NPP Cardio [id number] [facility]

## 2019-12-30 NOTE — DISCHARGE NOTE NURSING/CASE MANAGEMENT/SOCIAL WORK - NSDCPEPTSTRK_GEN_ALL_CORE
Prescribed medications/Risk factors for stroke/Stroke education booklet/Stroke support groups for patients, families, and friends/Call 911 for stroke/Need for follow up after discharge/Stroke warning signs and symptoms/Signs and symptoms of stroke

## 2019-12-30 NOTE — DISCHARGE NOTE PROVIDER - NSDCCPGOAL_GEN_ALL_CORE_FT
Follow up in 7-10 days. Please call the office to make an appointment. Activity as tolerated. Rest as needed. You may eat a regular diet. Do not lift anything heavier than 10 pounds. You may take motrin or advil for mild pain as needed, in addition to prescribed narcotic medication. Do not drive while taking narcotic pain medication. You may take over the counter stool softeners as needed. Call for any fever over 101, nausea, vomiting, severe pain, no passing of gas or bowel movement. You may shower.

## 2019-12-30 NOTE — PROGRESS NOTE ADULT - SUBJECTIVE AND OBJECTIVE BOX
SURGERY PROGRESS HPI:  Pt seen and examined at bedside. Pain is well controlled on pain medication and improving. Pt denies complaints. No acute events overnight. Pt tolerating diet. Pt denies nausea and vomiting. Voiding well. Pt denies chest pain, SOB, dizziness, fever, chills. Ambulating.    Vital Signs Last 24 Hrs  T(C): 37.1 (29 Dec 2019 23:28), Max: 37.1 (29 Dec 2019 23:28)  T(F): 98.8 (29 Dec 2019 23:28), Max: 98.8 (29 Dec 2019 23:28)  HR: 70 (29 Dec 2019 23:28) (60 - 70)  BP: 138/55 (29 Dec 2019 23:28) (123/50 - 138/55)  BP(mean): --  RR: 17 (29 Dec 2019 23:28) (17 - 18)  SpO2: 98% (29 Dec 2019 23:28) (93% - 98%)      PHYSICAL EXAM:  GENERAL: NAD  CHEST/LUNG: Clear to ausculation, bilaterally   HEART: RRR S1S2  ABDOMEN: non distended, +BS, soft, non tender, no guarding  LOWER BACK/BUTTOCK: Dressing clean/dry/intact. Packing removed from wound from pilonidal cyst excision site. No fluid expressed. Wound irrigated with NS. New iodoform packing and dressing placed. Patient tolerated well.   EXTREMITIES:  calf soft, non tender b/l    I&O's Detail    28 Dec 2019 07:01  -  29 Dec 2019 07:00  --------------------------------------------------------  IN:    lactated ringers.: 1200 mL    Oral Fluid: 760 mL  Total IN: 1960 mL    OUT:  Total OUT: 0 mL    Total NET: 1960 mL      29 Dec 2019 07:01  -  30 Dec 2019 05:53  --------------------------------------------------------  IN:    Oral Fluid: 360 mL  Total IN: 360 mL    OUT:  Total OUT: 0 mL    Total NET: 360 mL      LABS:                        9.6    9.85  )-----------( 267      ( 29 Dec 2019 06:25 )             30.0     12-29    139  |  107  |  26<H>  ----------------------------<  271<H>  4.2   |  25  |  1.39<H>    Ca    8.4<L>      29 Dec 2019 06:25      Culture Results:   <10,000 CFU/mL Normal Urogenital Karmen (12-28 @ 11:28)      Assessment: 80 y/o female PMHx GERD, DM, TIA, CVA, CAD, HTN, MI, open cholecystectomy, wrist/shoulder/ankle surgeries, excision of pilonidal cyst 12/18/19 c/o pain at her wound x 4 days. S/p I&D 12/27 at bedside.    Plan:  -pain management prn  -continue DVT prophylaxis, OOB, Ambulating  -continue local wound care with iodoform packing  -continue Zosyn  -f/u labs  -continue medical comanagement  -d/c planning with VNS  -will discuss pt with surgical attending

## 2019-12-30 NOTE — DISCHARGE NOTE NURSING/CASE MANAGEMENT/SOCIAL WORK - PATIENT PORTAL LINK FT
You can access the FollowMyHealth Patient Portal offered by [facility] by registering at the following website: [email or web address]. By joining [employer]’s FollowMyHealth portal, you will also be able to view your health information using other applications (apps) compatible with our system.

## 2020-01-03 ENCOUNTER — INPATIENT (INPATIENT)
Facility: HOSPITAL | Age: 80
LOS: 2 days | Discharge: HOME | End: 2020-01-06
Attending: SURGERY | Admitting: SURGERY
Payer: MEDICARE

## 2020-01-03 VITALS
DIASTOLIC BLOOD PRESSURE: 55 MMHG | OXYGEN SATURATION: 97 % | SYSTOLIC BLOOD PRESSURE: 155 MMHG | WEIGHT: 160.06 LBS | RESPIRATION RATE: 18 BRPM | HEIGHT: 64 IN | TEMPERATURE: 98 F | HEART RATE: 76 BPM

## 2020-01-03 DIAGNOSIS — Z98.890 OTHER SPECIFIED POSTPROCEDURAL STATES: Chronic | ICD-10-CM

## 2020-01-03 DIAGNOSIS — Z98.49 CATARACT EXTRACTION STATUS, UNSPECIFIED EYE: Chronic | ICD-10-CM

## 2020-01-03 DIAGNOSIS — Z90.49 ACQUIRED ABSENCE OF OTHER SPECIFIED PARTS OF DIGESTIVE TRACT: Chronic | ICD-10-CM

## 2020-01-03 LAB
ALBUMIN SERPL ELPH-MCNC: 3.2 G/DL — LOW (ref 3.3–5)
ALP SERPL-CCNC: 100 U/L — SIGNIFICANT CHANGE UP (ref 40–120)
ALT FLD-CCNC: 16 U/L — SIGNIFICANT CHANGE UP (ref 12–78)
ANION GAP SERPL CALC-SCNC: 11 MMOL/L — SIGNIFICANT CHANGE UP (ref 5–17)
APTT BLD: 34.8 SEC — SIGNIFICANT CHANGE UP (ref 27.5–36.3)
AST SERPL-CCNC: 12 U/L — LOW (ref 15–37)
BASOPHILS # BLD AUTO: 0.05 K/UL — SIGNIFICANT CHANGE UP (ref 0–0.2)
BASOPHILS NFR BLD AUTO: 0.3 % — SIGNIFICANT CHANGE UP (ref 0–2)
BILIRUB SERPL-MCNC: 0.3 MG/DL — SIGNIFICANT CHANGE UP (ref 0.2–1.2)
BUN SERPL-MCNC: 27 MG/DL — HIGH (ref 7–23)
CALCIUM SERPL-MCNC: 8.9 MG/DL — SIGNIFICANT CHANGE UP (ref 8.5–10.1)
CHLORIDE SERPL-SCNC: 106 MMOL/L — SIGNIFICANT CHANGE UP (ref 96–108)
CO2 SERPL-SCNC: 22 MMOL/L — SIGNIFICANT CHANGE UP (ref 22–31)
CREAT SERPL-MCNC: 1.43 MG/DL — HIGH (ref 0.5–1.3)
EOSINOPHIL # BLD AUTO: 0.36 K/UL — SIGNIFICANT CHANGE UP (ref 0–0.5)
EOSINOPHIL NFR BLD AUTO: 2.5 % — SIGNIFICANT CHANGE UP (ref 0–6)
GLUCOSE SERPL-MCNC: 111 MG/DL — HIGH (ref 70–99)
HCT VFR BLD CALC: 37.9 % — SIGNIFICANT CHANGE UP (ref 34.5–45)
HGB BLD-MCNC: 12.3 G/DL — SIGNIFICANT CHANGE UP (ref 11.5–15.5)
IMM GRANULOCYTES NFR BLD AUTO: 1.3 % — SIGNIFICANT CHANGE UP (ref 0–1.5)
INR BLD: 1 RATIO — SIGNIFICANT CHANGE UP (ref 0.88–1.16)
LYMPHOCYTES # BLD AUTO: 0.78 K/UL — LOW (ref 1–3.3)
LYMPHOCYTES # BLD AUTO: 5.4 % — LOW (ref 13–44)
MCHC RBC-ENTMCNC: 28.4 PG — SIGNIFICANT CHANGE UP (ref 27–34)
MCHC RBC-ENTMCNC: 32.5 GM/DL — SIGNIFICANT CHANGE UP (ref 32–36)
MCV RBC AUTO: 87.5 FL — SIGNIFICANT CHANGE UP (ref 80–100)
MONOCYTES # BLD AUTO: 0.57 K/UL — SIGNIFICANT CHANGE UP (ref 0–0.9)
MONOCYTES NFR BLD AUTO: 3.9 % — SIGNIFICANT CHANGE UP (ref 2–14)
NEUTROPHILS # BLD AUTO: 12.49 K/UL — HIGH (ref 1.8–7.4)
NEUTROPHILS NFR BLD AUTO: 86.6 % — HIGH (ref 43–77)
NRBC # BLD: 0 /100 WBCS — SIGNIFICANT CHANGE UP (ref 0–0)
PLATELET # BLD AUTO: 431 K/UL — HIGH (ref 150–400)
POTASSIUM SERPL-MCNC: 4 MMOL/L — SIGNIFICANT CHANGE UP (ref 3.5–5.3)
POTASSIUM SERPL-SCNC: 4 MMOL/L — SIGNIFICANT CHANGE UP (ref 3.5–5.3)
PROT SERPL-MCNC: 7.4 GM/DL — SIGNIFICANT CHANGE UP (ref 6–8.3)
PROTHROM AB SERPL-ACNC: 11.2 SEC — SIGNIFICANT CHANGE UP (ref 10–12.9)
RBC # BLD: 4.33 M/UL — SIGNIFICANT CHANGE UP (ref 3.8–5.2)
RBC # FLD: 13.3 % — SIGNIFICANT CHANGE UP (ref 10.3–14.5)
SODIUM SERPL-SCNC: 139 MMOL/L — SIGNIFICANT CHANGE UP (ref 135–145)
WBC # BLD: 14.44 K/UL — HIGH (ref 3.8–10.5)
WBC # FLD AUTO: 14.44 K/UL — HIGH (ref 3.8–10.5)

## 2020-01-03 PROCEDURE — 99284 EMERGENCY DEPT VISIT MOD MDM: CPT

## 2020-01-03 PROCEDURE — 93010 ELECTROCARDIOGRAM REPORT: CPT

## 2020-01-03 PROCEDURE — 99221 1ST HOSP IP/OBS SF/LOW 40: CPT | Mod: AI

## 2020-01-03 RX ORDER — PANTOPRAZOLE SODIUM 20 MG/1
40 TABLET, DELAYED RELEASE ORAL
Refills: 0 | Status: DISCONTINUED | OUTPATIENT
Start: 2020-01-03 | End: 2020-01-06

## 2020-01-03 RX ORDER — ACETAMINOPHEN 500 MG
650 TABLET ORAL EVERY 6 HOURS
Refills: 0 | Status: DISCONTINUED | OUTPATIENT
Start: 2020-01-03 | End: 2020-01-06

## 2020-01-03 RX ORDER — DEXTROSE 50 % IN WATER 50 %
25 SYRINGE (ML) INTRAVENOUS ONCE
Refills: 0 | Status: DISCONTINUED | OUTPATIENT
Start: 2020-01-03 | End: 2020-01-06

## 2020-01-03 RX ORDER — CITALOPRAM 10 MG/1
20 TABLET, FILM COATED ORAL DAILY
Refills: 0 | Status: DISCONTINUED | OUTPATIENT
Start: 2020-01-03 | End: 2020-01-06

## 2020-01-03 RX ORDER — DEXTROSE 50 % IN WATER 50 %
15 SYRINGE (ML) INTRAVENOUS ONCE
Refills: 0 | Status: DISCONTINUED | OUTPATIENT
Start: 2020-01-03 | End: 2020-01-06

## 2020-01-03 RX ORDER — PIPERACILLIN AND TAZOBACTAM 4; .5 G/20ML; G/20ML
3.38 INJECTION, POWDER, LYOPHILIZED, FOR SOLUTION INTRAVENOUS ONCE
Refills: 0 | Status: COMPLETED | OUTPATIENT
Start: 2020-01-03 | End: 2020-01-03

## 2020-01-03 RX ORDER — GLUCAGON INJECTION, SOLUTION 0.5 MG/.1ML
1 INJECTION, SOLUTION SUBCUTANEOUS ONCE
Refills: 0 | Status: DISCONTINUED | OUTPATIENT
Start: 2020-01-03 | End: 2020-01-06

## 2020-01-03 RX ORDER — EMPAGLIFLOZIN 10 MG/1
1 TABLET, FILM COATED ORAL
Qty: 0 | Refills: 0 | DISCHARGE

## 2020-01-03 RX ORDER — INSULIN GLARGINE 100 [IU]/ML
10 INJECTION, SOLUTION SUBCUTANEOUS AT BEDTIME
Refills: 0 | Status: DISCONTINUED | OUTPATIENT
Start: 2020-01-03 | End: 2020-01-06

## 2020-01-03 RX ORDER — PIPERACILLIN AND TAZOBACTAM 4; .5 G/20ML; G/20ML
3.38 INJECTION, POWDER, LYOPHILIZED, FOR SOLUTION INTRAVENOUS EVERY 8 HOURS
Refills: 0 | Status: DISCONTINUED | OUTPATIENT
Start: 2020-01-03 | End: 2020-01-06

## 2020-01-03 RX ORDER — AMLODIPINE BESYLATE 2.5 MG/1
10 TABLET ORAL DAILY
Refills: 0 | Status: DISCONTINUED | OUTPATIENT
Start: 2020-01-03 | End: 2020-01-06

## 2020-01-03 RX ORDER — SODIUM CHLORIDE 9 MG/ML
1000 INJECTION, SOLUTION INTRAVENOUS
Refills: 0 | Status: DISCONTINUED | OUTPATIENT
Start: 2020-01-03 | End: 2020-01-06

## 2020-01-03 RX ORDER — TICAGRELOR 90 MG/1
1 TABLET ORAL
Qty: 0 | Refills: 0 | DISCHARGE

## 2020-01-03 RX ORDER — DEXTROSE 50 % IN WATER 50 %
12.5 SYRINGE (ML) INTRAVENOUS ONCE
Refills: 0 | Status: DISCONTINUED | OUTPATIENT
Start: 2020-01-03 | End: 2020-01-06

## 2020-01-03 RX ORDER — CARVEDILOL PHOSPHATE 80 MG/1
6.25 CAPSULE, EXTENDED RELEASE ORAL EVERY 12 HOURS
Refills: 0 | Status: DISCONTINUED | OUTPATIENT
Start: 2020-01-03 | End: 2020-01-06

## 2020-01-03 RX ORDER — INSULIN LISPRO 100/ML
VIAL (ML) SUBCUTANEOUS
Refills: 0 | Status: DISCONTINUED | OUTPATIENT
Start: 2020-01-03 | End: 2020-01-06

## 2020-01-03 RX ORDER — AMLODIPINE BESYLATE 2.5 MG/1
1 TABLET ORAL
Qty: 0 | Refills: 0 | DISCHARGE

## 2020-01-03 RX ORDER — SITAGLIPTIN AND METFORMIN HYDROCHLORIDE 500; 50 MG/1; MG/1
1 TABLET, FILM COATED ORAL
Qty: 0 | Refills: 0 | DISCHARGE

## 2020-01-03 RX ORDER — HEPARIN SODIUM 5000 [USP'U]/ML
5000 INJECTION INTRAVENOUS; SUBCUTANEOUS EVERY 12 HOURS
Refills: 0 | Status: DISCONTINUED | OUTPATIENT
Start: 2020-01-03 | End: 2020-01-06

## 2020-01-03 RX ORDER — CARVEDILOL PHOSPHATE 80 MG/1
1 CAPSULE, EXTENDED RELEASE ORAL
Qty: 0 | Refills: 0 | DISCHARGE

## 2020-01-03 RX ORDER — INSULIN GLARGINE 100 [IU]/ML
10 INJECTION, SOLUTION SUBCUTANEOUS EVERY MORNING
Refills: 0 | Status: DISCONTINUED | OUTPATIENT
Start: 2020-01-03 | End: 2020-01-03

## 2020-01-03 RX ADMIN — AMLODIPINE BESYLATE 10 MILLIGRAM(S): 2.5 TABLET ORAL at 12:52

## 2020-01-03 RX ADMIN — PIPERACILLIN AND TAZOBACTAM 25 GRAM(S): 4; .5 INJECTION, POWDER, LYOPHILIZED, FOR SOLUTION INTRAVENOUS at 15:10

## 2020-01-03 RX ADMIN — HEPARIN SODIUM 5000 UNIT(S): 5000 INJECTION INTRAVENOUS; SUBCUTANEOUS at 17:56

## 2020-01-03 RX ADMIN — PIPERACILLIN AND TAZOBACTAM 25 GRAM(S): 4; .5 INJECTION, POWDER, LYOPHILIZED, FOR SOLUTION INTRAVENOUS at 22:08

## 2020-01-03 RX ADMIN — Medication 4: at 17:56

## 2020-01-03 RX ADMIN — INSULIN GLARGINE 10 UNIT(S): 100 INJECTION, SOLUTION SUBCUTANEOUS at 22:08

## 2020-01-03 RX ADMIN — SODIUM CHLORIDE 100 MILLILITER(S): 9 INJECTION, SOLUTION INTRAVENOUS at 06:42

## 2020-01-03 RX ADMIN — CARVEDILOL PHOSPHATE 6.25 MILLIGRAM(S): 80 CAPSULE, EXTENDED RELEASE ORAL at 17:56

## 2020-01-03 RX ADMIN — PIPERACILLIN AND TAZOBACTAM 200 GRAM(S): 4; .5 INJECTION, POWDER, LYOPHILIZED, FOR SOLUTION INTRAVENOUS at 08:29

## 2020-01-03 RX ADMIN — CITALOPRAM 20 MILLIGRAM(S): 10 TABLET, FILM COATED ORAL at 12:51

## 2020-01-03 NOTE — H&P ADULT - ASSESSMENT
80 y/o female s/p pilonidal cyst incision and drainage on 12/18/2019, returned to hospital on 12/27 with increased pain, s/p bedside debridement on 12/27, now returning to ED with vomiting, diarrhea, and chills for 2 days with purulent drainage and surrounding erythema of buttock wound, leukocytosis 14.44.    Plan  -Admit to Dr. Acosta.  -IV Zosyn.  -Daily wound changes per surgery team.    -?May possibly require further debridement.  -Continue medical management.  -DVT prophylaxis.  -Discussed with Dr. Acosta 80 y/o female s/p pilonidal cyst incision and drainage on 12/18/2019, returned to hospital on 12/27 with increased pain, s/p bedside debridement on 12/27, now returning to ED with vomiting, diarrhea, and chills for 2 days with purulent drainage and surrounding erythema of buttock wound, leukocytosis 14.44.     Plan  -Admit to Dr. Acosta.  -IV Zosyn.  -Daily wound changes per surgery team.    -?May possibly require further debridement.  -Continue medical management.  -Social work consult requested.    -DVT prophylaxis.  -Discussed with Dr. Acosta

## 2020-01-03 NOTE — PATIENT PROFILE ADULT - ABILITY TO HEAR (WITH HEARING AID OR HEARING APPLIANCE IF NORMALLY USED):
right ear. no hearing aide/Mildly to Moderately Impaired: difficulty hearing in some environments or speaker may need to increase volume or speak distinctly

## 2020-01-03 NOTE — H&P ADULT - NSHPLABSRESULTS_GEN_ALL_CORE
12.3   14.44 )-----------( 431      ( 03 Jan 2020 03:16 )             37.9   01-03    139  |  106  |  27<H>  ----------------------------<  111<H>  4.0   |  22  |  1.43<H>    Ca    8.9      03 Jan 2020 03:16    TPro  7.4  /  Alb  3.2<L>  /  TBili  0.3  /  DBili  x   /  AST  12<L>  /  ALT  16  /  AlkPhos  100  01-03

## 2020-01-03 NOTE — H&P ADULT - HISTORY OF PRESENT ILLNESS
78 y/o female with PMH GERD, DM, TIA, CVA, CAD, HTN, MI, open cholecystectomy, wrist/shoulder/ankle surgeries, excision of pilonidal cyst 12/18/19, S/p I&D 12/27 presenting to ED with c/o vomiting, diarrhea, and chills for 2 days.  Pt reports pain near her incision site has persisted since her discharge four days ago.  Pt also thinks "there may be some pus coming from the wound."  Denies chest pain, shortness of breath, dizziness. 78 y/o female with PMH of GERD, DM, TIA, CVA, CAD, HTN, MI, open cholecystectomy, wrist/shoulder/ankle surgeries, excision of pilonidal cyst 12/18/19, S/p I&D 12/27 presenting to ED with c/o vomiting, diarrhea, and chills for 2 days.  Pt reports pain near her incision site has persisted since her discharge four days ago.  Pt also thinks "there may be some pus coming from the wound."  Denies chest pain, shortness of breath, dizziness.

## 2020-01-03 NOTE — ED ADULT TRIAGE NOTE - CHIEF COMPLAINT QUOTE
surgery for cyst on 12/15 by Dr. Romero, discharged monday, reports diarrhea, vomiting. c/o back pain at surgical site and bottom

## 2020-01-03 NOTE — H&P ADULT - NSHPPHYSICALEXAM_GEN_ALL_CORE
GENERAL: NAD, lying comfortably in bed  HEAD:  Atraumatic, Normocephalic  EYES: conjunctiva and sclera clear  ENMT: No tonsillar erythema, exudates, or enlargement.    NERVOUS SYSTEM:  Alert & Oriented X3  CHEST/LUNG: Clear to percussion bilaterally; No rales, rhonchi, wheezing, or rubs  HEART: Regular rate and rhythm; No rubs, or gallops, +S1,S2  ABDOMEN: Soft, nontender, nondistended  BUTTOCK: Gauze dressing over pilonidal cyst incision saturated with feces, iodoform packing removed, +purulent drainage, +tissue sloughing.  Wound cleansed with normal saline and repacked with iodoform packing.  Covere with gauze and tape.  Pt tolerated well.    EXTREMITIES:  No clubbing, cyanosis, or edema

## 2020-01-03 NOTE — ED PROVIDER NOTE - CLINICAL SUMMARY MEDICAL DECISION MAKING FREE TEXT BOX
Ddx: surgical site infection/ recurrence of pilonidal cyst  Plan: Cbc, cmp, fluids, surgical consult, reassess

## 2020-01-03 NOTE — ED PROVIDER NOTE - OBJECTIVE STATEMENT
Pt is a 78 yo lady with a pmhx of HTN, HL, IDDM, CAD sp stent, CVA, recent pilonidal cyst surgery who presents to the ED with increased pain and discharge on surgical site. No fevers, no chest pain, no sob.

## 2020-01-03 NOTE — ED ADULT NURSE NOTE - OBJECTIVE STATEMENT
pt c/o n/v, & d x 2 days. Pt also explains that she would like to have her wound checked. Pt post abscess drain last Friday.

## 2020-01-04 LAB
ANION GAP SERPL CALC-SCNC: 4 MMOL/L — LOW (ref 5–17)
BUN SERPL-MCNC: 19 MG/DL — SIGNIFICANT CHANGE UP (ref 7–23)
CALCIUM SERPL-MCNC: 7.4 MG/DL — LOW (ref 8.5–10.1)
CHLORIDE SERPL-SCNC: 109 MMOL/L — HIGH (ref 96–108)
CO2 SERPL-SCNC: 26 MMOL/L — SIGNIFICANT CHANGE UP (ref 22–31)
CREAT SERPL-MCNC: 1.4 MG/DL — HIGH (ref 0.5–1.3)
GLUCOSE SERPL-MCNC: 184 MG/DL — HIGH (ref 70–99)
HCT VFR BLD CALC: 29.9 % — LOW (ref 34.5–45)
HGB BLD-MCNC: 9.9 G/DL — LOW (ref 11.5–15.5)
MCHC RBC-ENTMCNC: 28.8 PG — SIGNIFICANT CHANGE UP (ref 27–34)
MCHC RBC-ENTMCNC: 33.1 GM/DL — SIGNIFICANT CHANGE UP (ref 32–36)
MCV RBC AUTO: 86.9 FL — SIGNIFICANT CHANGE UP (ref 80–100)
NRBC # BLD: 0 /100 WBCS — SIGNIFICANT CHANGE UP (ref 0–0)
PLATELET # BLD AUTO: 297 K/UL — SIGNIFICANT CHANGE UP (ref 150–400)
POTASSIUM SERPL-MCNC: 3.4 MMOL/L — LOW (ref 3.5–5.3)
POTASSIUM SERPL-SCNC: 3.4 MMOL/L — LOW (ref 3.5–5.3)
RBC # BLD: 3.44 M/UL — LOW (ref 3.8–5.2)
RBC # FLD: 13.2 % — SIGNIFICANT CHANGE UP (ref 10.3–14.5)
SODIUM SERPL-SCNC: 139 MMOL/L — SIGNIFICANT CHANGE UP (ref 135–145)
WBC # BLD: 7.25 K/UL — SIGNIFICANT CHANGE UP (ref 3.8–10.5)
WBC # FLD AUTO: 7.25 K/UL — SIGNIFICANT CHANGE UP (ref 3.8–10.5)

## 2020-01-04 PROCEDURE — 99231 SBSQ HOSP IP/OBS SF/LOW 25: CPT

## 2020-01-04 RX ORDER — POTASSIUM CHLORIDE 20 MEQ
20 PACKET (EA) ORAL ONCE
Refills: 0 | Status: COMPLETED | OUTPATIENT
Start: 2020-01-04 | End: 2020-01-04

## 2020-01-04 RX ADMIN — PIPERACILLIN AND TAZOBACTAM 25 GRAM(S): 4; .5 INJECTION, POWDER, LYOPHILIZED, FOR SOLUTION INTRAVENOUS at 06:17

## 2020-01-04 RX ADMIN — HEPARIN SODIUM 5000 UNIT(S): 5000 INJECTION INTRAVENOUS; SUBCUTANEOUS at 06:16

## 2020-01-04 RX ADMIN — Medication 650 MILLIGRAM(S): at 19:46

## 2020-01-04 RX ADMIN — INSULIN GLARGINE 10 UNIT(S): 100 INJECTION, SOLUTION SUBCUTANEOUS at 21:27

## 2020-01-04 RX ADMIN — Medication 650 MILLIGRAM(S): at 11:30

## 2020-01-04 RX ADMIN — CARVEDILOL PHOSPHATE 6.25 MILLIGRAM(S): 80 CAPSULE, EXTENDED RELEASE ORAL at 06:16

## 2020-01-04 RX ADMIN — PIPERACILLIN AND TAZOBACTAM 25 GRAM(S): 4; .5 INJECTION, POWDER, LYOPHILIZED, FOR SOLUTION INTRAVENOUS at 21:27

## 2020-01-04 RX ADMIN — CITALOPRAM 20 MILLIGRAM(S): 10 TABLET, FILM COATED ORAL at 12:55

## 2020-01-04 RX ADMIN — Medication 2: at 11:46

## 2020-01-04 RX ADMIN — Medication 650 MILLIGRAM(S): at 20:45

## 2020-01-04 RX ADMIN — CARVEDILOL PHOSPHATE 6.25 MILLIGRAM(S): 80 CAPSULE, EXTENDED RELEASE ORAL at 17:01

## 2020-01-04 RX ADMIN — Medication 4: at 08:06

## 2020-01-04 RX ADMIN — PANTOPRAZOLE SODIUM 40 MILLIGRAM(S): 20 TABLET, DELAYED RELEASE ORAL at 08:08

## 2020-01-04 RX ADMIN — HEPARIN SODIUM 5000 UNIT(S): 5000 INJECTION INTRAVENOUS; SUBCUTANEOUS at 17:01

## 2020-01-04 RX ADMIN — Medication 20 MILLIEQUIVALENT(S): at 08:56

## 2020-01-04 RX ADMIN — Medication 650 MILLIGRAM(S): at 10:16

## 2020-01-04 RX ADMIN — SODIUM CHLORIDE 100 MILLILITER(S): 9 INJECTION, SOLUTION INTRAVENOUS at 21:27

## 2020-01-04 RX ADMIN — Medication 6: at 16:01

## 2020-01-04 RX ADMIN — PIPERACILLIN AND TAZOBACTAM 25 GRAM(S): 4; .5 INJECTION, POWDER, LYOPHILIZED, FOR SOLUTION INTRAVENOUS at 14:23

## 2020-01-04 RX ADMIN — AMLODIPINE BESYLATE 10 MILLIGRAM(S): 2.5 TABLET ORAL at 06:16

## 2020-01-04 NOTE — PROGRESS NOTE ADULT - SUBJECTIVE AND OBJECTIVE BOX
Patient seen and examined bedside. Comfortable. Eating breakfast.  Reports diarrhea is improved, normal BM this am.  No n/v/f/c. Denies pain at present.    T(F): 98.3 (01-04-20 @ 05:57), Max: 99.3 (01-04-20 @ 00:01)  HR: 62 (01-04-20 @ 05:57) (62 - 75)  BP: 154/91 (01-04-20 @ 05:57) (127/51 - 154/91)  RR: 17 (01-04-20 @ 05:57) (15 - 18)  SpO2: 95% (01-04-20 @ 05:57) (95% - 99%)    POCT Blood Glucose.: 206 mg/dL (04 Jan 2020 08:03)  POCT Blood Glucose.: 132 mg/dL (03 Jan 2020 21:48)  POCT Blood Glucose.: 249 mg/dL (03 Jan 2020 17:39)  POCT Blood Glucose.: 225 mg/dL (03 Jan 2020 13:35)      PHYSICAL EXAM:  General: NAD, WDWN  Neuro:  Alert & oriented x 3  HEENT: NCAT, EOMI, conjunctiva clear  CV: +S1+S2 regular rate and rhythm  Lung: clear to auscultation bilaterally, respirations nonlabored, good inspiratory effort  Abdomen: soft, NTND.  Back: dressing CDI  Extremities: no pedal edema or calf tenderness noted     LABS:                        9.9    7.25  )-----------( 297      ( 04 Jan 2020 07:17 )             29.9     01-04    139  |  109<H>  |  19  ----------------------------<  184<H>  3.4<L>   |  26  |  1.40<H>    Ca    7.4<L>      04 Jan 2020 07:17    TPro  7.4  /  Alb  3.2<L>  /  TBili  0.3  /  DBili  x   /  AST  12<L>  /  ALT  16  /  AlkPhos  100  01-03    PT/INR - ( 03 Jan 2020 03:16 )   PT: 11.2 sec;   INR: 1.00 ratio    PTT - ( 03 Jan 2020 03:16 )  PTT:34.8 sec      Culture Results:   <10,000 CFU/mL Normal Urogenital Karmen (12-28 @ 11:28)      A/P: 80 y/o female s/p pilonidal cyst incision and drainage on 12/18/2019, returned to hospital on 12/27 with increased pain, s/p bedside debridement on 12/27, presently admitted with vomiting, diarrhea, and chills for 2 days with purulent drainage and surrounding erythema of buttock wound   leukocytosis improved.  Hypokalemia    Plan  -c/w local wound care and Zosyn.  -potassium repleted  -Discussed with Dr. Acosta Patient seen and examined bedside. Comfortable. Eating breakfast.  Reports diarrhea is improved, normal BM this am.  No n/v/f/c. Denies pain at present.  RN changed dressing with packing this am.     T(F): 98.3 (01-04-20 @ 05:57), Max: 99.3 (01-04-20 @ 00:01)  HR: 62 (01-04-20 @ 05:57) (62 - 75)  BP: 154/91 (01-04-20 @ 05:57) (127/51 - 154/91)  RR: 17 (01-04-20 @ 05:57) (15 - 18)  SpO2: 95% (01-04-20 @ 05:57) (95% - 99%)    POCT Blood Glucose.: 206 mg/dL (04 Jan 2020 08:03)  POCT Blood Glucose.: 132 mg/dL (03 Jan 2020 21:48)  POCT Blood Glucose.: 249 mg/dL (03 Jan 2020 17:39)  POCT Blood Glucose.: 225 mg/dL (03 Jan 2020 13:35)      PHYSICAL EXAM:  General: NAD, WDWN  Neuro:  Alert & oriented x 3  HEENT: NCAT, EOMI, conjunctiva clear  CV: +S1+S2 regular rate and rhythm  Lung: clear to auscultation bilaterally, respirations nonlabored, good inspiratory effort  Abdomen: soft, NTND.  Back: dressing CDI, packing in place, improved erythema  Extremities: no pedal edema or calf tenderness noted     LABS:                        9.9    7.25  )-----------( 297      ( 04 Jan 2020 07:17 )             29.9     01-04    139  |  109<H>  |  19  ----------------------------<  184<H>  3.4<L>   |  26  |  1.40<H>    Ca    7.4<L>      04 Jan 2020 07:17    TPro  7.4  /  Alb  3.2<L>  /  TBili  0.3  /  DBili  x   /  AST  12<L>  /  ALT  16  /  AlkPhos  100  01-03    PT/INR - ( 03 Jan 2020 03:16 )   PT: 11.2 sec;   INR: 1.00 ratio    PTT - ( 03 Jan 2020 03:16 )  PTT:34.8 sec      Culture Results:   <10,000 CFU/mL Normal Urogenital Karmen (12-28 @ 11:28)      A/P: 78 y/o female s/p pilonidal cyst incision and drainage on 12/18/2019, returned to hospital on 12/27 with increased pain, s/p bedside debridement on 12/27, presently admitted with infected drained pilonidal cyst  leukocytosis improved.  Hypokalemia    Plan  -c/w local wound care c saline/packing/foam dsg. C/w Zosyn.  -potassium repleted  -Discussed with Dr. Acosta

## 2020-01-05 LAB
ANION GAP SERPL CALC-SCNC: 6 MMOL/L — SIGNIFICANT CHANGE UP (ref 5–17)
BUN SERPL-MCNC: 17 MG/DL — SIGNIFICANT CHANGE UP (ref 7–23)
CALCIUM SERPL-MCNC: 8 MG/DL — LOW (ref 8.5–10.1)
CHLORIDE SERPL-SCNC: 109 MMOL/L — HIGH (ref 96–108)
CO2 SERPL-SCNC: 26 MMOL/L — SIGNIFICANT CHANGE UP (ref 22–31)
CREAT SERPL-MCNC: 1.3 MG/DL — SIGNIFICANT CHANGE UP (ref 0.5–1.3)
GLUCOSE SERPL-MCNC: 144 MG/DL — HIGH (ref 70–99)
HCT VFR BLD CALC: 32.3 % — LOW (ref 34.5–45)
HGB BLD-MCNC: 10.3 G/DL — LOW (ref 11.5–15.5)
MCHC RBC-ENTMCNC: 28.1 PG — SIGNIFICANT CHANGE UP (ref 27–34)
MCHC RBC-ENTMCNC: 31.9 GM/DL — LOW (ref 32–36)
MCV RBC AUTO: 88 FL — SIGNIFICANT CHANGE UP (ref 80–100)
NRBC # BLD: 0 /100 WBCS — SIGNIFICANT CHANGE UP (ref 0–0)
PLATELET # BLD AUTO: 320 K/UL — SIGNIFICANT CHANGE UP (ref 150–400)
POTASSIUM SERPL-MCNC: 3.8 MMOL/L — SIGNIFICANT CHANGE UP (ref 3.5–5.3)
POTASSIUM SERPL-SCNC: 3.8 MMOL/L — SIGNIFICANT CHANGE UP (ref 3.5–5.3)
RBC # BLD: 3.67 M/UL — LOW (ref 3.8–5.2)
RBC # FLD: 13.2 % — SIGNIFICANT CHANGE UP (ref 10.3–14.5)
SODIUM SERPL-SCNC: 141 MMOL/L — SIGNIFICANT CHANGE UP (ref 135–145)
WBC # BLD: 7.4 K/UL — SIGNIFICANT CHANGE UP (ref 3.8–10.5)
WBC # FLD AUTO: 7.4 K/UL — SIGNIFICANT CHANGE UP (ref 3.8–10.5)

## 2020-01-05 PROCEDURE — 99231 SBSQ HOSP IP/OBS SF/LOW 25: CPT

## 2020-01-05 RX ADMIN — SODIUM CHLORIDE 100 MILLILITER(S): 9 INJECTION, SOLUTION INTRAVENOUS at 22:19

## 2020-01-05 RX ADMIN — Medication 650 MILLIGRAM(S): at 15:37

## 2020-01-05 RX ADMIN — Medication 650 MILLIGRAM(S): at 01:48

## 2020-01-05 RX ADMIN — CARVEDILOL PHOSPHATE 6.25 MILLIGRAM(S): 80 CAPSULE, EXTENDED RELEASE ORAL at 18:55

## 2020-01-05 RX ADMIN — Medication 4: at 09:34

## 2020-01-05 RX ADMIN — PANTOPRAZOLE SODIUM 40 MILLIGRAM(S): 20 TABLET, DELAYED RELEASE ORAL at 06:47

## 2020-01-05 RX ADMIN — PIPERACILLIN AND TAZOBACTAM 25 GRAM(S): 4; .5 INJECTION, POWDER, LYOPHILIZED, FOR SOLUTION INTRAVENOUS at 22:19

## 2020-01-05 RX ADMIN — PIPERACILLIN AND TAZOBACTAM 25 GRAM(S): 4; .5 INJECTION, POWDER, LYOPHILIZED, FOR SOLUTION INTRAVENOUS at 15:38

## 2020-01-05 RX ADMIN — Medication 650 MILLIGRAM(S): at 16:46

## 2020-01-05 RX ADMIN — PIPERACILLIN AND TAZOBACTAM 25 GRAM(S): 4; .5 INJECTION, POWDER, LYOPHILIZED, FOR SOLUTION INTRAVENOUS at 05:17

## 2020-01-05 RX ADMIN — AMLODIPINE BESYLATE 10 MILLIGRAM(S): 2.5 TABLET ORAL at 06:47

## 2020-01-05 RX ADMIN — HEPARIN SODIUM 5000 UNIT(S): 5000 INJECTION INTRAVENOUS; SUBCUTANEOUS at 06:47

## 2020-01-05 RX ADMIN — Medication 650 MILLIGRAM(S): at 23:00

## 2020-01-05 RX ADMIN — CARVEDILOL PHOSPHATE 6.25 MILLIGRAM(S): 80 CAPSULE, EXTENDED RELEASE ORAL at 06:47

## 2020-01-05 RX ADMIN — Medication 650 MILLIGRAM(S): at 02:45

## 2020-01-05 RX ADMIN — Medication 2: at 17:05

## 2020-01-05 RX ADMIN — CITALOPRAM 20 MILLIGRAM(S): 10 TABLET, FILM COATED ORAL at 12:41

## 2020-01-05 RX ADMIN — HEPARIN SODIUM 5000 UNIT(S): 5000 INJECTION INTRAVENOUS; SUBCUTANEOUS at 18:54

## 2020-01-05 RX ADMIN — INSULIN GLARGINE 10 UNIT(S): 100 INJECTION, SOLUTION SUBCUTANEOUS at 22:20

## 2020-01-05 RX ADMIN — Medication 650 MILLIGRAM(S): at 22:16

## 2020-01-05 NOTE — PROGRESS NOTE ADULT - SUBJECTIVE AND OBJECTIVE BOX
Patient seen and examined at bedside with no complaints.   Patient comfortable and resting.  Admits to flatus/BM. Denies pain, nasuea/vomiting. Tolerating diet.      Vital Signs Last 24 Hrs  T(F): 97.4 (01-05-20 @ 05:50), Max: 98.8 (01-04-20 @ 17:12)  HR: 79 (01-05-20 @ 05:50)  BP: 150/54 (01-05-20 @ 05:50)  RR: 17 (01-05-20 @ 05:50)  SpO2: 93% (01-05-20 @ 05:50)    CAPILLARY BLOOD GLUCOSE      POCT Blood Glucose.: 121 mg/dL (04 Jan 2020 21:26)      GENERAL: Alert, NAD  CHEST/LUNG: Clear to auscultation bilaterally, respirations nonlabored  HEART: S1S2, Regular rate and rhythm;   ABDOMEN: + Bowel sounds, soft, Nontender, Nondistended  EXTREMITIES:  no calf tenderness, No edema  BACK: Sacral wound pink, appropriate tenderness, without purulent drainage, packing replaced      LABS:                        10.3   7.40  )-----------( 320      ( 05 Jan 2020 07:46 )             32.3     01-05    141  |  109<H>  |  17  ----------------------------<  144<H>  3.8   |  26  |  1.30    Ca    8.0<L>      05 Jan 2020 07:46    A/P: 78 y/o female s/p pilonidal cyst incision and drainage on 12/18/2019, returned to hospital on 12/27 with increased pain, s/p bedside debridement on 12/27, presently admitted with infected drained pilonidal cyst. leukocytosis improved. Wound infection improving.    - C/W with daily packing, saline with foam dressing..  - C/W Zosyn  - DC planning to home with aid vs. rehab  - Patient seen and examined with Dr. Acosta

## 2020-01-06 ENCOUNTER — TRANSCRIPTION ENCOUNTER (OUTPATIENT)
Age: 80
End: 2020-01-06

## 2020-01-06 ENCOUNTER — APPOINTMENT (OUTPATIENT)
Dept: SURGERY | Facility: CLINIC | Age: 80
End: 2020-01-06

## 2020-01-06 VITALS
OXYGEN SATURATION: 97 % | TEMPERATURE: 99 F | HEART RATE: 60 BPM | SYSTOLIC BLOOD PRESSURE: 136 MMHG | DIASTOLIC BLOOD PRESSURE: 68 MMHG | RESPIRATION RATE: 17 BRPM

## 2020-01-06 DIAGNOSIS — I25.10 ATHEROSCLEROTIC HEART DISEASE OF NATIVE CORONARY ARTERY WITHOUT ANGINA PECTORIS: ICD-10-CM

## 2020-01-06 DIAGNOSIS — E11.9 TYPE 2 DIABETES MELLITUS WITHOUT COMPLICATIONS: ICD-10-CM

## 2020-01-06 DIAGNOSIS — Z86.73 PERSONAL HISTORY OF TRANSIENT ISCHEMIC ATTACK (TIA), AND CEREBRAL INFARCTION WITHOUT RESIDUAL DEFICITS: ICD-10-CM

## 2020-01-06 DIAGNOSIS — K21.9 GASTRO-ESOPHAGEAL REFLUX DISEASE WITHOUT ESOPHAGITIS: ICD-10-CM
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
## 2020-01-06 DIAGNOSIS — I25.2 OLD MYOCARDIAL INFARCTION: ICD-10-CM

## 2020-01-06 DIAGNOSIS — I10 ESSENTIAL (PRIMARY) HYPERTENSION: ICD-10-CM

## 2020-01-06 DIAGNOSIS — T81.40XA INFECTION FOLLOWING A PROCEDURE, UNSPECIFIED, INITIAL ENCOUNTER: ICD-10-CM

## 2020-01-06 PROCEDURE — 99238 HOSP IP/OBS DSCHRG MGMT 30/<: CPT

## 2020-01-06 RX ORDER — LACTOBACILLUS ACIDOPHILUS 100MM CELL
1 CAPSULE ORAL
Qty: 10 | Refills: 0
Start: 2020-01-06 | End: 2020-01-15

## 2020-01-06 RX ADMIN — CITALOPRAM 20 MILLIGRAM(S): 10 TABLET, FILM COATED ORAL at 12:24

## 2020-01-06 RX ADMIN — CARVEDILOL PHOSPHATE 6.25 MILLIGRAM(S): 80 CAPSULE, EXTENDED RELEASE ORAL at 05:16

## 2020-01-06 RX ADMIN — HEPARIN SODIUM 5000 UNIT(S): 5000 INJECTION INTRAVENOUS; SUBCUTANEOUS at 05:17

## 2020-01-06 RX ADMIN — PIPERACILLIN AND TAZOBACTAM 25 GRAM(S): 4; .5 INJECTION, POWDER, LYOPHILIZED, FOR SOLUTION INTRAVENOUS at 13:18

## 2020-01-06 RX ADMIN — Medication 1 APPLICATION(S): at 17:05

## 2020-01-06 RX ADMIN — CARVEDILOL PHOSPHATE 6.25 MILLIGRAM(S): 80 CAPSULE, EXTENDED RELEASE ORAL at 17:06

## 2020-01-06 RX ADMIN — AMLODIPINE BESYLATE 10 MILLIGRAM(S): 2.5 TABLET ORAL at 05:16

## 2020-01-06 RX ADMIN — PIPERACILLIN AND TAZOBACTAM 25 GRAM(S): 4; .5 INJECTION, POWDER, LYOPHILIZED, FOR SOLUTION INTRAVENOUS at 05:17

## 2020-01-06 RX ADMIN — HEPARIN SODIUM 5000 UNIT(S): 5000 INJECTION INTRAVENOUS; SUBCUTANEOUS at 17:06

## 2020-01-06 RX ADMIN — Medication 2: at 12:24

## 2020-01-06 RX ADMIN — Medication 4: at 09:05

## 2020-01-06 RX ADMIN — PANTOPRAZOLE SODIUM 40 MILLIGRAM(S): 20 TABLET, DELAYED RELEASE ORAL at 09:09

## 2020-01-06 NOTE — DISCHARGE NOTE PROVIDER - NSDCFUADDAPPT_GEN_ALL_CORE_FT
Please follow up with Dr. Acosta in 7-10 days.   You may call the office to schedule an appointment.

## 2020-01-06 NOTE — DISCHARGE NOTE NURSING/CASE MANAGEMENT/SOCIAL WORK - PATIENT PORTAL LINK FT
You can access the FollowMyHealth Patient Portal offered by [facility] by registering at the following website: [email or web address]. By joining [facility]’s FollowMyHealth portal, you will also be able to view your health information using other applications (apps) compatible with our system.

## 2020-01-06 NOTE — DISCHARGE NOTE PROVIDER - HOSPITAL COURSE
80 y/o female with PMH of GERD, DM, TIA, CVA, CAD, HTN, MI, open cholecystectomy, wrist/shoulder/ankle surgeries, excision of pilonidal cyst 12/18/19, S/p I&D 12/27 presenting to ED 1/4/20 with c/o n/v, found to have infected wound.     Patient was admitted for IV antibiotics and wound care.    When patient's wound was clean and she was tolerating a regular diet, she was discharged home with home health aide to assist in wound care.

## 2020-01-06 NOTE — DISCHARGE NOTE PROVIDER - NSDCMRMEDTOKEN_GEN_ALL_CORE_FT
amLODIPine 10 mg oral tablet: 1 tab(s) orally once a day  amoxicillin-clavulanate 875 mg-125 mg oral tablet: 1 tab(s) orally every 12 hours MDD:2 tabs  Brilinta (ticagrelor) 90 mg oral tablet: 1 tab(s) orally 2 times a day  carvedilol 6.25 mg oral tablet: 1 tab(s) orally 2 times a day  citalopram 20 mg oral tablet: 1 tab(s) orally once a day  Janumet 50 mg-500 mg oral tablet: 1 tab(s) orally once a day  Jardiance 10 mg oral tablet: 1 tab(s) orally once a day (in the morning)  lactobacillus acidophilus oral capsule: 1 cap(s) orally once a day MDD:1 cap  levimir: 14 unit(s) subcutaneous once a day  NovoLO dose(s) subcutaneous 3 times a day  omeprazole 20 mg oral delayed release capsule: 1 cap(s) orally once a day  rosuvastatin 10 mg oral tablet: 1 tab(s) orally once a day

## 2020-01-06 NOTE — DISCHARGE NOTE PROVIDER - CARE PROVIDER_API CALL
Vince Acosta)  Surgery  [address], [address] [place] [address]  Phone: [phone]  Fax: [phone]  Follow Up Time:

## 2020-01-06 NOTE — PROGRESS NOTE ADULT - SUBJECTIVE AND OBJECTIVE BOX
Patient seen and examined at bedside.  Pt reports mild pain to the gluteal region, although improved from yesterday.    Denies fever, chills, pain, nausea/ vomiting.   Tolerating diet.       Vital Signs Last 24 Hrs  T(F): 97.6 (01-06-20 @ 05:16), Max: 97.6 (01-05-20 @ 17:28)  HR: 54 (01-06-20 @ 05:16)  BP: 139/59 (01-06-20 @ 05:16)  RR: 15 (01-06-20 @ 05:16)  SpO2: 97% (01-06-20 @ 05:16)      POCT Blood Glucose.: 222 mg/dL (06 Jan 2020 09:00)      GENERAL: Alert, NAD  CHEST/LUNG: respirations nonlabored  HEART: S1S2, Regular rate and rhythm;   ABDOMEN:  soft, Nontender, Nondistended  BUTTOCK: iodoform packing removed from incision, +purulent drainage, wound cleansed with normal saline, packing replaced, dry gauze dressing and tape applied   EXTREMITIES:  no calf tenderness, No edema    I&O's Detail    05 Jan 2020 07:01  -  06 Jan 2020 07:00  --------------------------------------------------------  IN:    dextrose 5% + sodium chloride 0.45%.: 1200 mL    Solution: 200 mL  Total IN: 1400 mL    OUT:  Total OUT: 0 mL    Total NET: 1400 mL          LABS:                        10.3   7.40  )-----------( 320      ( 05 Jan 2020 07:46 )             32.3     01-05    141  |  109<H>  |  17  ----------------------------<  144<H>  3.8   |  26  |  1.30    Ca    8.0<L>      05 Jan 2020 07:46        Impression: 78 y/o female s/p pilonidal cyst incision and drainage on 12/18/2019, returned to hospital on 12/27 with increased pain, s/p bedside debridement on 12/27, presently admitted with infected drained pilonidal cyst  leukocytosis improved.    Plan  -Patient for discharge today.    -Continue wound care, daily dressing change.    -Clotrimazole ordered for surrounding skin.    -Per social work, patient not candidate for home health aide.    -Discussed with Dr. Acosta. Patient seen and examined at bedside.  Pt reports mild pain to the gluteal region, although improved from yesterday.    Denies fever, chills, pain, nausea/ vomiting.   Tolerating diet.       Vital Signs Last 24 Hrs  T(F): 97.6 (01-06-20 @ 05:16), Max: 97.6 (01-05-20 @ 17:28)  HR: 54 (01-06-20 @ 05:16)  BP: 139/59 (01-06-20 @ 05:16)  RR: 15 (01-06-20 @ 05:16)  SpO2: 97% (01-06-20 @ 05:16)      POCT Blood Glucose.: 222 mg/dL (06 Jan 2020 09:00)      GENERAL: Alert, NAD  CHEST/LUNG: respirations nonlabored  HEART: S1S2, Regular rate and rhythm;   ABDOMEN:  soft, Nontender, Nondistended  BUTTOCK: iodoform packing removed from incision, +purulent drainage, wound cleansed with normal saline, packing replaced, dry gauze dressing and tape applied   EXTREMITIES:  no calf tenderness, No edema    I&O's Detail    05 Jan 2020 07:01  -  06 Jan 2020 07:00  --------------------------------------------------------  IN:    dextrose 5% + sodium chloride 0.45%.: 1200 mL    Solution: 200 mL  Total IN: 1400 mL    OUT:  Total OUT: 0 mL    Total NET: 1400 mL          LABS:                        10.3   7.40  )-----------( 320      ( 05 Jan 2020 07:46 )             32.3     01-05    141  |  109<H>  |  17  ----------------------------<  144<H>  3.8   |  26  |  1.30    Ca    8.0<L>      05 Jan 2020 07:46        Impression: 80 y/o female s/p pilonidal cyst incision and drainage on 12/18/2019, returned to hospital on 12/27 with increased pain, s/p bedside debridement on 12/27, presently admitted with infected drained pilonidal cyst  leukocytosis improved.    Plan  -Patient for discharge today with home wound care.    -Per social work, patient not a candidate for home health aide.    -Clotrimazole ordered for surrounding skin erythema.    -Discussed with Dr. Acosta.

## 2020-01-06 NOTE — DISCHARGE NOTE PROVIDER - NSDCFUSCHEDAPPT_GEN_ALL_CORE_FT
TONY CORREA ; 01/06/2020 ; NPP Gensurg [address] T[address] ; 01/22/2020 ; NPP Endocrin [address] T[address] [place] ; 02/03/2020 ; NPP Intmed [id number] [facility]  TONY CORREA ; 02/03/2020 ; NPP Cardio [id number] [facility] TONY CORREA ; 01/22/2020 ; NPP Endocrin [address]  TONY CORREA ; 02/03/2020 ; NPP Intmed [id number] [facility]  TONY CORREA ; 02/03/2020 ; NPP Cardio [id number] [facility]

## 2020-01-06 NOTE — DISCHARGE NOTE PROVIDER - NSDCCPCAREPLAN_GEN_ALL_CORE_FT
PRINCIPAL DISCHARGE DIAGNOSIS  Diagnosis: Pilonidal abscess  Assessment and Plan of Treatment: Please take your antibiotics as prescribed.  Drink plenty of fluids to prevent constipation and fruit juices without pulp that are high in vitamin C. You may take motrin or advil for mild pain as needed.You may take over the counter stool softeners as needed. Call for any fever over 101, nausea, vomiting, severe pain, no passing of gas or bowel movement. Shower daily and allow wound to get washed over with water, then repack wound.

## 2020-01-07 ENCOUNTER — RX RENEWAL (OUTPATIENT)
Age: 80
End: 2020-01-07

## 2020-01-08 ENCOUNTER — RX RENEWAL (OUTPATIENT)
Age: 80
End: 2020-01-08

## 2020-01-09 DIAGNOSIS — D72.829 ELEVATED WHITE BLOOD CELL COUNT, UNSPECIFIED: ICD-10-CM

## 2020-01-09 DIAGNOSIS — I25.10 ATHEROSCLEROTIC HEART DISEASE OF NATIVE CORONARY ARTERY WITHOUT ANGINA PECTORIS: ICD-10-CM

## 2020-01-09 DIAGNOSIS — I25.2 OLD MYOCARDIAL INFARCTION: ICD-10-CM

## 2020-01-09 DIAGNOSIS — T81.49XA INFECTION FOLLOWING A PROCEDURE, OTHER SURGICAL SITE, INITIAL ENCOUNTER: ICD-10-CM

## 2020-01-09 DIAGNOSIS — E87.6 HYPOKALEMIA: ICD-10-CM

## 2020-01-09 DIAGNOSIS — L05.01 PILONIDAL CYST WITH ABSCESS: ICD-10-CM

## 2020-01-09 DIAGNOSIS — E11.9 TYPE 2 DIABETES MELLITUS WITHOUT COMPLICATIONS: ICD-10-CM

## 2020-01-09 DIAGNOSIS — K21.9 GASTRO-ESOPHAGEAL REFLUX DISEASE WITHOUT ESOPHAGITIS: ICD-10-CM

## 2020-01-09 DIAGNOSIS — I10 ESSENTIAL (PRIMARY) HYPERTENSION: ICD-10-CM

## 2020-01-09 DIAGNOSIS — Z86.73 PERSONAL HISTORY OF TRANSIENT ISCHEMIC ATTACK (TIA), AND CEREBRAL INFARCTION WITHOUT RESIDUAL DEFICITS: ICD-10-CM
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
## 2020-01-10 ENCOUNTER — MEDICATION RENEWAL (OUTPATIENT)
Age: 80
End: 2020-01-10

## 2020-01-10 ENCOUNTER — RX RENEWAL (OUTPATIENT)
Age: 80
End: 2020-01-10

## 2020-01-13 ENCOUNTER — APPOINTMENT (OUTPATIENT)
Dept: SURGERY | Facility: CLINIC | Age: 80
End: 2020-01-13
Payer: MEDICARE

## 2020-01-13 VITALS
HEIGHT: 64 IN | WEIGHT: 165 LBS | SYSTOLIC BLOOD PRESSURE: 131 MMHG | TEMPERATURE: 97.6 F | DIASTOLIC BLOOD PRESSURE: 70 MMHG | OXYGEN SATURATION: 98 % | HEART RATE: 71 BPM | BODY MASS INDEX: 28.17 KG/M2

## 2020-01-13 PROCEDURE — 99024 POSTOP FOLLOW-UP VISIT: CPT

## 2020-01-13 NOTE — HISTORY OF PRESENT ILLNESS
[de-identified] : pt seen and examined. pt is s/p excision of the pilonidal cyst. she had opening of the wound with infection which has been packed and is improving. on exam, the wound edges are granulating and no evidence of active infection. return to office in two weeks .

## 2020-01-22 ENCOUNTER — APPOINTMENT (OUTPATIENT)
Dept: ENDOCRINOLOGY | Facility: CLINIC | Age: 80
End: 2020-01-22
Payer: MEDICARE

## 2020-01-22 VITALS
HEIGHT: 64 IN | RESPIRATION RATE: 16 BRPM | WEIGHT: 162 LBS | DIASTOLIC BLOOD PRESSURE: 50 MMHG | BODY MASS INDEX: 27.66 KG/M2 | OXYGEN SATURATION: 97 % | SYSTOLIC BLOOD PRESSURE: 110 MMHG | HEART RATE: 72 BPM

## 2020-01-22 DIAGNOSIS — E11.65 TYPE 2 DIABETES MELLITUS WITH HYPERGLYCEMIA: ICD-10-CM

## 2020-01-22 DIAGNOSIS — Z13.820 ENCOUNTER FOR SCREENING FOR OSTEOPOROSIS: ICD-10-CM

## 2020-01-22 LAB — GLUCOSE BLDC GLUCOMTR-MCNC: 169

## 2020-01-22 PROCEDURE — 99214 OFFICE O/P EST MOD 30 MIN: CPT | Mod: 25

## 2020-01-22 PROCEDURE — 36415 COLL VENOUS BLD VENIPUNCTURE: CPT

## 2020-01-22 RX ORDER — FLASH GLUCOSE SENSOR
KIT MISCELLANEOUS
Qty: 2 | Refills: 12 | Status: ACTIVE | COMMUNITY
Start: 2020-01-22 | End: 1900-01-01

## 2020-01-22 RX ORDER — FLASH GLUCOSE SCANNING READER
EACH MISCELLANEOUS
Qty: 1 | Refills: 0 | Status: ACTIVE | COMMUNITY
Start: 2020-01-22 | End: 1900-01-01

## 2020-01-22 NOTE — ASSESSMENT
[Carbohydrate Consistent Diet] : carbohydrate consistent diet [Hypoglycemia Management] : hypoglycemia management [Diabetes Foot Care] : diabetes foot care [Long Term Vascular Complications] : long term vascular complications of diabetes [Action and use of Insulin] : action and use of short and long-acting insulin [Self Monitoring of Blood Glucose] : self monitoring of blood glucose [FreeTextEntry1] : - labs today\par - cont  present regimen with Levemir 14 units HS,  Novolog  4-4-4,  janumet xr 50/500 mg qd, jardiance 10mg qd, pending labs\par - Monitor renal functions\par - continue crestor 10 mg HS\par - apply for  John 14 days. John PRO today\par - screening DXA\par RTC 2- 3 weeks for sensor download and CDE evaluation\par \par

## 2020-01-22 NOTE — HISTORY OF PRESENT ILLNESS
[FreeTextEntry1] : F/u for diabetes management\par \par *** Jan 22, 2020 ***\par \par s/p pilonidal cyst excision.\par  Levemir 14 units HS,  Novolog  4-4-4,  janumet xr 50/500 mg qd, jardiance 10mg qd\par reports fbs- 110's, ppg- 130's\par today ppg- 169\par \par most recent a1c- 7.7 from 10/19\par *** Oct 03, 2019 ***\par \par feels better overall\par on  Levemir 14 un HS, Novolog  3-3-4,  janumet xr 50/500 mg qd, jardiance 10mg qd\par no log, reports fbs- 90's, ppg- 140's\par no more  hypo's\par \par \par HISTORY OF PRESENT ILLNESS. \par \par Ms. CORREA was diagnosed with Diabetes Mellitus Type 2 in 1994\par Reports history HTN, dyslipidemia, CAD (s/p AMI and PTCI x 1), TIA, CHF,  COPD. \par She denies  known complications of retinopathy, nephropathy, or neuropathy. \par Presently on Levemir 20 un HS, Novolog 8 un tid ac, Farxiga 5mg\par She was previously on metformin, but stopped for unclear reason\par Blood sugars are checked 4 times a day. \par Did not bring log book, but reported are typically as following: FBS- low 100's, PPG- 150's\par Hypoglycemia frequency: 2 x week, in 60's in am\par Fingerstick glucose in the office today is 234 mg/dL 4 hours after eating. \par Diet: not following ADA, high in CHO\par Exercise: none\par \par Last dilated eye - 5/19\par Last podiatry visit  - 5/19\par Last cardiology evaluation - 7/29\par Last stress test - 2017\par Last 2-D Echo - 5/19, preserved LV function\par Last nephrology evaluation - does not remember\par Last neurology evaluation- does not remember\par \par Lab review: a1c- 7.7 , LDL- 87, TG- 204, creat- 1.32 <-- 1.24\par

## 2020-01-27 ENCOUNTER — APPOINTMENT (OUTPATIENT)
Dept: SURGERY | Facility: CLINIC | Age: 80
End: 2020-01-27
Payer: MEDICARE

## 2020-01-27 VITALS
BODY MASS INDEX: 27.66 KG/M2 | DIASTOLIC BLOOD PRESSURE: 73 MMHG | OXYGEN SATURATION: 97 % | TEMPERATURE: 98 F | HEART RATE: 73 BPM | HEIGHT: 64 IN | WEIGHT: 162 LBS | SYSTOLIC BLOOD PRESSURE: 118 MMHG

## 2020-01-27 PROCEDURE — 99024 POSTOP FOLLOW-UP VISIT: CPT

## 2020-01-27 NOTE — HISTORY OF PRESENT ILLNESS
[de-identified] : pt seen and examined. pt is s/p excision of pilonidal cystectomy.  pt had post op wound infection which was treated with local wound care. the wound is healing well.  pt was advised to keep the area clean and dry.  pt expressed full understanding.

## 2020-01-28 ENCOUNTER — RX RENEWAL (OUTPATIENT)
Age: 80
End: 2020-01-28

## 2020-02-03 ENCOUNTER — APPOINTMENT (OUTPATIENT)
Dept: CARDIOLOGY | Facility: CLINIC | Age: 80
End: 2020-02-03
Payer: MEDICARE

## 2020-02-03 ENCOUNTER — APPOINTMENT (OUTPATIENT)
Dept: INTERNAL MEDICINE | Facility: CLINIC | Age: 80
End: 2020-02-03
Payer: MEDICARE

## 2020-02-03 ENCOUNTER — NON-APPOINTMENT (OUTPATIENT)
Age: 80
End: 2020-02-03

## 2020-02-03 VITALS — OXYGEN SATURATION: 97 % | HEIGHT: 64 IN | WEIGHT: 161 LBS | BODY MASS INDEX: 27.49 KG/M2 | HEART RATE: 87 BPM

## 2020-02-03 VITALS — SYSTOLIC BLOOD PRESSURE: 110 MMHG | DIASTOLIC BLOOD PRESSURE: 60 MMHG | TEMPERATURE: 98.1 F

## 2020-02-03 VITALS — SYSTOLIC BLOOD PRESSURE: 110 MMHG | DIASTOLIC BLOOD PRESSURE: 50 MMHG

## 2020-02-03 DIAGNOSIS — I10 ESSENTIAL (PRIMARY) HYPERTENSION: ICD-10-CM

## 2020-02-03 DIAGNOSIS — N18.3 CHRONIC KIDNEY DISEASE, STAGE 3 (MODERATE): ICD-10-CM

## 2020-02-03 DIAGNOSIS — Z79.4 TYPE 2 DIABETES MELLITUS WITH UNSPECIFIED COMPLICATIONS: ICD-10-CM

## 2020-02-03 DIAGNOSIS — K21.9 GASTRO-ESOPHAGEAL REFLUX DISEASE W/OUT ESOPHAGITIS: ICD-10-CM

## 2020-02-03 DIAGNOSIS — E11.8 TYPE 2 DIABETES MELLITUS WITH UNSPECIFIED COMPLICATIONS: ICD-10-CM

## 2020-02-03 DIAGNOSIS — I50.9 HEART FAILURE, UNSPECIFIED: ICD-10-CM

## 2020-02-03 DIAGNOSIS — J45.909 UNSPECIFIED ASTHMA, UNCOMPLICATED: ICD-10-CM

## 2020-02-03 DIAGNOSIS — I25.2 OLD MYOCARDIAL INFARCTION: ICD-10-CM

## 2020-02-03 DIAGNOSIS — J44.9 CHRONIC OBSTRUCTIVE PULMONARY DISEASE, UNSPECIFIED: ICD-10-CM

## 2020-02-03 DIAGNOSIS — F32.9 MAJOR DEPRESSIVE DISORDER, SINGLE EPISODE, UNSPECIFIED: ICD-10-CM

## 2020-02-03 DIAGNOSIS — E78.5 HYPERLIPIDEMIA, UNSPECIFIED: ICD-10-CM

## 2020-02-03 DIAGNOSIS — R06.02 SHORTNESS OF BREATH: ICD-10-CM

## 2020-02-03 PROCEDURE — 36415 COLL VENOUS BLD VENIPUNCTURE: CPT

## 2020-02-03 PROCEDURE — 93000 ELECTROCARDIOGRAM COMPLETE: CPT

## 2020-02-03 PROCEDURE — 99214 OFFICE O/P EST MOD 30 MIN: CPT | Mod: 25

## 2020-02-03 RX ORDER — BUDESONIDE AND FORMOTEROL FUMARATE DIHYDRATE 160; 4.5 UG/1; UG/1
160-4.5 AEROSOL RESPIRATORY (INHALATION) TWICE DAILY
Qty: 1 | Refills: 0 | Status: ACTIVE | COMMUNITY
Start: 1900-01-01 | End: 1900-01-01

## 2020-02-03 NOTE — ASSESSMENT
[FreeTextEntry1] : Patient is a 79 year old female with a complex medical history who was seen today for follow up. \par She had an asthma exacerbation while in the office and received an Albuterol nebulizer treatment, to which she responded well. \par PSSV 23 was administered in Left deltoid.

## 2020-02-03 NOTE — DISCUSSION/SUMMARY
[FreeTextEntry1] : He is stable from cardiac point of view.  I did not make any changes at this time.

## 2020-02-03 NOTE — REVIEW OF SYSTEMS
[Fever] : no fever [Fatigue] : no fatigue [Lower Ext Edema] : no lower extremity edema [Palpitations] : no palpitations [Chest Pain] : no chest pain [Shortness Of Breath] : no shortness of breath [Abdominal Pain] : no abdominal pain [Wheezing] : no wheezing

## 2020-02-03 NOTE — REASON FOR VISIT
[Follow-Up - Clinic] : a clinic follow-up of [Dyspnea] : dyspnea [Hypertension] : hypertension [Prior Myocardial Infarction] : a prior myocardial infarction [FreeTextEntry1] : COPD etc

## 2020-02-03 NOTE — PLAN
[FreeTextEntry1] : Blood drawn for routine lab analysis. \par Symbicort given to patient for long term asthma maintenance. \par To return in 3 months for routine follow up.

## 2020-02-03 NOTE — ASSESSMENT
[FreeTextEntry1] : She has exacerbation of her asthma.  She responded very well to the nebulizer treatment given in the office.  She is stable from cardiac point of view.  Blood pressure is well controlled.  She has no chest pain or palpitation.  This week she has appointment with orthopedic surgeons for left hip pain.

## 2020-02-03 NOTE — HISTORY OF PRESENT ILLNESS
[FreeTextEntry1] : She was in her usual state of health until December 18 when she had surgery at [facility] for pilonidal cyst.  Subsequently she was rehospitalized for 4 days for drainage from the operative site.  It had "closed up".  Later she was readmitted for diarrhea and vomiting for 4 days as well.\par \par Yesterday she started coughing and wheezing.  There was no fever or phlegm.

## 2020-02-03 NOTE — HISTORY OF PRESENT ILLNESS
[FreeTextEntry1] : follow up  [de-identified] : Mrs. Isaac is a 79 year old female with a complex medical history who presents today for routine follow up. \par At the time of the visit, patient had an asthma attack that responded well to Albuterol. \par Currently, she has no shortness of breath, chest pain, and wheeze. \par She states that she takes Albuterol nebulizer as needed. Denies use of Symbicort. \par

## 2020-02-03 NOTE — HEALTH RISK ASSESSMENT
[No] : In the past 12 months have you used drugs other than those required for medical reasons? No [No falls in past year] : Patient reported no falls in the past year [0] : 2) Feeling down, depressed, or hopeless: Not at all (0) [de-identified] : none [] : No [Audit-CScore] : 0 [de-identified] : low salt,low sugar  [ZED1Dygts] : 0 [de-identified] : none

## 2020-02-03 NOTE — PHYSICAL EXAM
[General Appearance - Well Developed] : well developed [Normal Appearance] : normal appearance [Well Groomed] : well groomed [General Appearance - Well Nourished] : well nourished [No Deformities] : no deformities [Normal Conjunctiva] : the conjunctiva exhibited no abnormalities [General Appearance - In No Acute Distress] : no acute distress [Eyelids - No Xanthelasma] : the eyelids demonstrated no xanthelasmas [Normal Oral Mucosa] : normal oral mucosa [No Oral Cyanosis] : no oral cyanosis [No Oral Pallor] : no oral pallor [Normal Jugular Venous A Waves Present] : normal jugular venous A waves present [Normal Jugular Venous V Waves Present] : normal jugular venous V waves present [No Jugular Venous Irizarry A Waves] : no jugular venous irizarry A waves [Respiration, Rhythm And Depth] : normal respiratory rhythm and effort [Exaggerated Use Of Accessory Muscles For Inspiration] : no accessory muscle use [Heart Rate And Rhythm] : heart rate and rhythm were normal [Heart Sounds] : normal S1 and S2 [Murmurs] : no murmurs present [Abdomen Soft] : soft [Abdomen Tenderness] : non-tender [Abdomen Mass (___ Cm)] : no abdominal mass palpated [Abnormal Walk] : normal gait [Nail Clubbing] : no clubbing of the fingernails [Gait - Sufficient For Exercise Testing] : the gait was sufficient for exercise testing [Cyanosis, Localized] : no localized cyanosis [Petechial Hemorrhages (___cm)] : no petechial hemorrhages [Skin Color & Pigmentation] : normal skin color and pigmentation [] : no rash [No Venous Stasis] : no venous stasis [Skin Lesions] : no skin lesions [No Skin Ulcers] : no skin ulcer [No Xanthoma] : no  xanthoma was observed [Oriented To Time, Place, And Person] : oriented to person, place, and time [Affect] : the affect was normal [Mood] : the mood was normal [No Anxiety] : not feeling anxious [FreeTextEntry1] : Dry scaly rash with papular lesions.

## 2020-02-03 NOTE — PHYSICAL EXAM
[No Acute Distress] : no acute distress [Well-Appearing] : well-appearing [Normal Sclera/Conjunctiva] : normal sclera/conjunctiva [Normal Outer Ear/Nose] : the outer ears and nose were normal in appearance [No JVD] : no jugular venous distention [No Respiratory Distress] : no respiratory distress  [Clear to Auscultation] : lungs were clear to auscultation bilaterally [No Accessory Muscle Use] : no accessory muscle use [Normal Rate] : normal rate  [Pedal Pulses Present] : the pedal pulses are present [No Edema] : there was no peripheral edema [Soft] : abdomen soft [Non Tender] : non-tender [Normal Gait] : normal gait [Normal Affect] : the affect was normal [Alert and Oriented x3] : oriented to person, place, and time

## 2020-03-05 ENCOUNTER — APPOINTMENT (OUTPATIENT)
Dept: SURGERY | Facility: CLINIC | Age: 80
End: 2020-03-05

## 2020-03-12 ENCOUNTER — APPOINTMENT (OUTPATIENT)
Dept: SURGERY | Facility: CLINIC | Age: 80
End: 2020-03-12
Payer: MEDICARE

## 2020-03-12 VITALS
RESPIRATION RATE: 16 BRPM | HEART RATE: 71 BPM | DIASTOLIC BLOOD PRESSURE: 75 MMHG | BODY MASS INDEX: 26.8 KG/M2 | WEIGHT: 157 LBS | HEIGHT: 64 IN | OXYGEN SATURATION: 97 % | SYSTOLIC BLOOD PRESSURE: 121 MMHG

## 2020-03-12 DIAGNOSIS — L05.91 PILONIDAL CYST W/OUT ABSCESS: ICD-10-CM

## 2020-03-12 PROCEDURE — 99212 OFFICE O/P EST SF 10 MIN: CPT

## 2020-03-12 NOTE — HISTORY OF PRESENT ILLNESS
[de-identified] : patient seen and examined. pt came back for follow up visit. wound edges are healing well. pt was advised to come back if any issues. at this point, she can come back on as needed basis. aall lquestions were answered in detail and the patient expressed full understanding.

## 2020-03-22 LAB
ALBUMIN SERPL ELPH-MCNC: 4.6 G/DL
ALP BLD-CCNC: 98 U/L
ALT SERPL-CCNC: 7 U/L
ANION GAP SERPL CALC-SCNC: 16 MMOL/L
AST SERPL-CCNC: 16 U/L
BASOPHILS # BLD AUTO: 0.07 K/UL
BASOPHILS NFR BLD AUTO: 0.7 %
BILIRUB SERPL-MCNC: 0.3 MG/DL
BUN SERPL-MCNC: 18 MG/DL
CALCIUM SERPL-MCNC: 9.5 MG/DL
CHLORIDE SERPL-SCNC: 107 MMOL/L
CHOLEST SERPL-MCNC: 152 MG/DL
CHOLEST/HDLC SERPL: 2.5 RATIO
CO2 SERPL-SCNC: 19 MMOL/L
CREAT SERPL-MCNC: 1.37 MG/DL
EOSINOPHIL # BLD AUTO: 0.44 K/UL
EOSINOPHIL NFR BLD AUTO: 4.6 %
ESTIMATED AVERAGE GLUCOSE: 154 MG/DL
GLUCOSE SERPL-MCNC: 128 MG/DL
HBA1C MFR BLD HPLC: 7 %
HCT VFR BLD CALC: 40.5 %
HDLC SERPL-MCNC: 60 MG/DL
HGB BLD-MCNC: 12.8 G/DL
IMM GRANULOCYTES NFR BLD AUTO: 1 %
LDLC SERPL CALC-MCNC: 62 MG/DL
LYMPHOCYTES # BLD AUTO: 1.29 K/UL
LYMPHOCYTES NFR BLD AUTO: 13.4 %
MAN DIFF?: NORMAL
MCHC RBC-ENTMCNC: 28.3 PG
MCHC RBC-ENTMCNC: 31.6 GM/DL
MCV RBC AUTO: 89.6 FL
MONOCYTES # BLD AUTO: 0.69 K/UL
MONOCYTES NFR BLD AUTO: 7.2 %
NEUTROPHILS # BLD AUTO: 7.01 K/UL
NEUTROPHILS NFR BLD AUTO: 73.1 %
PLATELET # BLD AUTO: 361 K/UL
POTASSIUM SERPL-SCNC: 5 MMOL/L
PROT SERPL-MCNC: 7 G/DL
RBC # BLD: 4.52 M/UL
RBC # FLD: 14.4 %
SODIUM SERPL-SCNC: 141 MMOL/L
T4 FREE SERPL-MCNC: 1.1 NG/DL
TRIGL SERPL-MCNC: 148 MG/DL
TSH SERPL-ACNC: 4.21 UIU/ML
WBC # FLD AUTO: 9.6 K/UL

## 2020-03-24 ENCOUNTER — RX RENEWAL (OUTPATIENT)
Age: 80
End: 2020-03-24

## 2020-03-30 ENCOUNTER — RX RENEWAL (OUTPATIENT)
Age: 80
End: 2020-03-30

## 2020-04-22 ENCOUNTER — RX RENEWAL (OUTPATIENT)
Age: 80
End: 2020-04-22

## 2020-04-30 ENCOUNTER — RX RENEWAL (OUTPATIENT)
Age: 80
End: 2020-04-30

## 2020-05-04 ENCOUNTER — APPOINTMENT (OUTPATIENT)
Dept: INTERNAL MEDICINE | Facility: CLINIC | Age: 80
End: 2020-05-04

## 2020-05-04 ENCOUNTER — APPOINTMENT (OUTPATIENT)
Dept: CARDIOLOGY | Facility: CLINIC | Age: 80
End: 2020-05-04

## 2020-05-07 ENCOUNTER — APPOINTMENT (OUTPATIENT)
Dept: INTERNAL MEDICINE | Facility: CLINIC | Age: 80
End: 2020-05-07
Payer: MEDICARE

## 2020-05-07 PROCEDURE — 99446 NTRPROF PH1/NTRNET/EHR 5-10: CPT

## 2020-05-07 PROCEDURE — 99441: CPT

## 2020-05-08 ENCOUNTER — RX RENEWAL (OUTPATIENT)
Age: 80
End: 2020-05-08

## 2020-05-26 ENCOUNTER — RX RENEWAL (OUTPATIENT)
Age: 80
End: 2020-05-26

## 2020-07-20 ENCOUNTER — APPOINTMENT (OUTPATIENT)
Dept: INTERNAL MEDICINE | Facility: CLINIC | Age: 80
End: 2020-07-20
Payer: MEDICARE

## 2020-07-20 ENCOUNTER — RX RENEWAL (OUTPATIENT)
Age: 80
End: 2020-07-20

## 2020-07-20 PROCEDURE — 99201 OFFICE OUTPATIENT NEW 10 MINUTES: CPT | Mod: 95

## 2020-07-27 ENCOUNTER — RX RENEWAL (OUTPATIENT)
Age: 80
End: 2020-07-27

## 2020-07-31 ENCOUNTER — RX RENEWAL (OUTPATIENT)
Age: 80
End: 2020-07-31

## 2020-08-03 ENCOUNTER — RX RENEWAL (OUTPATIENT)
Age: 80
End: 2020-08-03

## 2020-08-24 ENCOUNTER — RX RENEWAL (OUTPATIENT)
Age: 80
End: 2020-08-24

## 2020-08-24 NOTE — PROCEDURE NOTE - ADDITIONAL PROCEDURE DETAILS
denies pain/discomfort
Pt had recent excision of pilonidal cyst. As per the daughter, she lives at home by herself and appeared to not have taken care of the wound. There was some tenderness at the wound and I made the decision to open the wound and pack it. There was no irvin purulence but the fluid was murky in nature. Will admit for IV abx and local wound care.

## 2020-09-27 ENCOUNTER — RX RENEWAL (OUTPATIENT)
Age: 80
End: 2020-09-27

## 2020-10-12 RX ORDER — ALBUTEROL SULFATE 2 MG/5ML
2 SYRUP ORAL
Refills: 0 | Status: DISCONTINUED | COMMUNITY
End: 2020-10-12

## 2020-10-19 ENCOUNTER — RX RENEWAL (OUTPATIENT)
Age: 80
End: 2020-10-19

## 2020-10-26 ENCOUNTER — RX RENEWAL (OUTPATIENT)
Age: 80
End: 2020-10-26

## 2020-10-26 RX ORDER — OMEPRAZOLE 20 MG/1
20 CAPSULE, DELAYED RELEASE ORAL
Qty: 90 | Refills: 0 | Status: ACTIVE | COMMUNITY
Start: 2019-10-10 | End: 1900-01-01

## 2020-10-31 ENCOUNTER — RX RENEWAL (OUTPATIENT)
Age: 80
End: 2020-10-31

## 2020-10-31 RX ORDER — CARVEDILOL 6.25 MG/1
6.25 TABLET, FILM COATED ORAL
Qty: 180 | Refills: 0 | Status: ACTIVE | COMMUNITY
Start: 2019-01-28 | End: 1900-01-01

## 2020-11-02 ENCOUNTER — RX RENEWAL (OUTPATIENT)
Age: 80
End: 2020-11-02

## 2020-11-12 ENCOUNTER — RX RENEWAL (OUTPATIENT)
Age: 80
End: 2020-11-12

## 2020-11-12 RX ORDER — AMLODIPINE BESYLATE 10 MG/1
10 TABLET ORAL
Qty: 90 | Refills: 1 | Status: ACTIVE | COMMUNITY
Start: 2019-01-23 | End: 1900-01-01

## 2020-11-17 ENCOUNTER — RX RENEWAL (OUTPATIENT)
Age: 80
End: 2020-11-17

## 2020-11-17 RX ORDER — CITALOPRAM HYDROBROMIDE 20 MG/1
20 TABLET, FILM COATED ORAL
Qty: 90 | Refills: 1 | Status: ACTIVE | COMMUNITY
Start: 2019-01-10 | End: 1900-01-01

## 2020-12-07 ENCOUNTER — RX RENEWAL (OUTPATIENT)
Age: 80
End: 2020-12-07

## 2020-12-07 RX ORDER — EMPAGLIFLOZIN 10 MG/1
10 TABLET, FILM COATED ORAL DAILY
Qty: 30 | Refills: 0 | Status: ACTIVE | COMMUNITY
Start: 2019-08-12 | End: 1900-01-01

## 2020-12-08 ENCOUNTER — RX RENEWAL (OUTPATIENT)
Age: 80
End: 2020-12-08

## 2020-12-15 ENCOUNTER — RX RENEWAL (OUTPATIENT)
Age: 80
End: 2020-12-15

## 2020-12-21 ENCOUNTER — RX RENEWAL (OUTPATIENT)
Age: 80
End: 2020-12-21

## 2020-12-21 RX ORDER — REPAGLINIDE 2 MG/1
2 TABLET ORAL 3 TIMES DAILY
Qty: 270 | Refills: 1 | Status: ACTIVE | COMMUNITY
Start: 2019-06-12 | End: 1900-01-01

## 2021-01-03 ENCOUNTER — RX RENEWAL (OUTPATIENT)
Age: 81
End: 2021-01-03

## 2021-02-05 ENCOUNTER — RX RENEWAL (OUTPATIENT)
Age: 81
End: 2021-02-05

## 2021-02-05 RX ORDER — SITAGLIPTIN AND METFORMIN HYDROCHLORIDE 50; 500 MG/1; MG/1
50-500 TABLET, FILM COATED, EXTENDED RELEASE ORAL DAILY
Qty: 30 | Refills: 0 | Status: ACTIVE | COMMUNITY
Start: 2019-08-12 | End: 1900-01-01

## 2021-02-08 ENCOUNTER — RX RENEWAL (OUTPATIENT)
Age: 81
End: 2021-02-08

## 2021-02-17 ENCOUNTER — RX RENEWAL (OUTPATIENT)
Age: 81
End: 2021-02-17

## 2021-03-04 ENCOUNTER — APPOINTMENT (OUTPATIENT)
Dept: VASCULAR SURGERY | Facility: CLINIC | Age: 81
End: 2021-03-04
Payer: MEDICARE

## 2021-03-04 VITALS — DIASTOLIC BLOOD PRESSURE: 72 MMHG | HEART RATE: 64 BPM | SYSTOLIC BLOOD PRESSURE: 113 MMHG

## 2021-03-04 VITALS
HEART RATE: 63 BPM | WEIGHT: 150 LBS | SYSTOLIC BLOOD PRESSURE: 105 MMHG | TEMPERATURE: 97.6 F | DIASTOLIC BLOOD PRESSURE: 66 MMHG | BODY MASS INDEX: 26.58 KG/M2 | HEIGHT: 63 IN

## 2021-03-04 DIAGNOSIS — I65.23 OCCLUSION AND STENOSIS OF BILATERAL CAROTID ARTERIES: ICD-10-CM

## 2021-03-04 PROCEDURE — 99204 OFFICE O/P NEW MOD 45 MIN: CPT

## 2021-03-04 PROCEDURE — 99072 ADDL SUPL MATRL&STAF TM PHE: CPT

## 2021-03-04 PROCEDURE — 93880 EXTRACRANIAL BILAT STUDY: CPT

## 2021-03-15 ENCOUNTER — NON-APPOINTMENT (OUTPATIENT)
Age: 81
End: 2021-03-15

## 2021-03-16 ENCOUNTER — NON-APPOINTMENT (OUTPATIENT)
Age: 81
End: 2021-03-16

## 2021-03-16 ENCOUNTER — RX RENEWAL (OUTPATIENT)
Age: 81
End: 2021-03-16

## 2021-03-16 RX ORDER — ROSUVASTATIN CALCIUM 20 MG/1
20 TABLET, FILM COATED ORAL DAILY
Qty: 30 | Refills: 6 | Status: ACTIVE | COMMUNITY
Start: 2020-08-24 | End: 1900-01-01

## 2021-05-19 ENCOUNTER — RX RENEWAL (OUTPATIENT)
Age: 81
End: 2021-05-19

## 2021-07-20 ENCOUNTER — RX RENEWAL (OUTPATIENT)
Age: 81
End: 2021-07-20

## 2021-07-20 RX ORDER — INSULIN ASPART 100 [IU]/ML
100 INJECTION, SOLUTION INTRAVENOUS; SUBCUTANEOUS
Qty: 1 | Refills: 0 | Status: ACTIVE | COMMUNITY
Start: 2019-08-23 | End: 1900-01-01

## 2021-07-26 ENCOUNTER — RX RENEWAL (OUTPATIENT)
Age: 81
End: 2021-07-26

## 2021-07-26 RX ORDER — ALBUTEROL SULFATE 90 UG/1
108 (90 BASE) INHALANT RESPIRATORY (INHALATION)
Qty: 3 | Refills: 1 | Status: ACTIVE | COMMUNITY
Start: 2019-08-26 | End: 1900-01-01

## 2021-10-06 PROBLEM — I10 ESSENTIAL HYPERTENSION: Status: ACTIVE | Noted: 2017-12-20

## 2021-12-29 ENCOUNTER — RX RENEWAL (OUTPATIENT)
Age: 81
End: 2021-12-29

## 2021-12-30 ENCOUNTER — RX RENEWAL (OUTPATIENT)
Age: 81
End: 2021-12-30

## 2021-12-30 RX ORDER — BLOOD SUGAR DIAGNOSTIC
STRIP MISCELLANEOUS
Qty: 300 | Refills: 1 | Status: ACTIVE | COMMUNITY
Start: 2020-04-30 | End: 1900-01-01

## 2022-01-03 NOTE — ASU PREOP CHECKLIST - SPO2 (%)
Plastic Surgery  PRE-OP NOTE  Resident Note     Procedure:STSG, possible adjacent tissue transfer, possible flap reconstruction, and placement of negative pressure therapy dressing to the abdominal wall and perineum. Consent: Obtained from patient    Labs    Recent Labs     01/01/22  0525 01/03/22  0559   WBC 8.5 8.0   HGB 8.5* 8.4*   HCT 25.5* 25.1*   MCV 93.9 93.5    307     Recent Labs     01/01/22  0525 01/03/22  0559    138   K 3.9 3.9    100   CO2 32 31   PHOS 2.9 2.9   BUN 24* 19   CREATININE 0.6* <0.5*      No results for input(s): AST, ALT, ALB, BILIDIR, BILITOT, ALKPHOS in the last 72 hours. No results for input(s): LIPASE, AMYLASE in the last 72 hours. No results for input(s): PROT, INR, APTT in the last 72 hours. No results for input(s): CKTOTAL, CKMB, CKMBINDEX, TROPONINI in the last 72 hours. CXR: Airspace disease on 12/09, will obtain repeat to evaluate radiographic improvement vs progression given persistent oxygen requirement  EKG: Normal sinus on 12/09    Orders:   1. Diet: NPO,  IVF: LR at 100  2. Pre op Medications: Patient on scheduled ABx, no need for additional coverage perioperatively   3. Labs to be drawn: Type and Screen  4. Ensure 2 functional peripheral IVs are available prior to transport to the OR   5.  Anesthesia to see patient    Rosamaria Fisher MD, MPH  PGY-3 General Surgery  01/03/22  8:02 AM 97

## 2022-03-27 ENCOUNTER — RX RENEWAL (OUTPATIENT)
Age: 82
End: 2022-03-27

## 2023-01-16 NOTE — ASU DISCHARGE PLAN (ADULT/PEDIATRIC) - DIET/FLUID RESTRICTION
No change [facility] Pregnancy And Lactation Text: This medication has not been assigned a Pregnancy Risk Category. It is unknown if the medication is excreted in breast milk.

## 2023-05-15 NOTE — ASU DISCHARGE PLAN (ADULT/PEDIATRIC) - PROCEDURE
----- Message from Caitlin Childs MD sent at 5/13/2023  7:00 PM EDT -----  Please let patient know that he does NOT have gallbladder disease.  Rather he has fat in his liver EXCISION PILONIDAL CYST

## 2023-12-27 NOTE — ASU PATIENT PROFILE, ADULT - NS PRO MODE OF ARRIVAL
Patient calling to have Sutab prescription sent to pharmacy for colonoscopy scheduled for tomorrow. 12/28/23. After reviewing Sutab instructions with patient she decided not to use Sutab and to continue with Miralax as instructed when originally scheduled.    ambulatory

## 2024-03-19 NOTE — DISCHARGE NOTE PROVIDER - NSDCCPGOAL_GEN_ALL_CORE_FT
"Encounter Date: 3/19/2024       History     Chief Complaint   Patient presents with    multiple complaints     Pt. Woke up this AM with numbness to b/l arms and legs and whole face with headache.     Patient is a 65-year-old female with past medical history of a TIA L5-S1 epidural steroid injection, cholecystectomy, appendectomy, GERD, hypertension who presents with numbness to her left side that she notes started last night before bed and was much worse when she woke up today.  She states she had an epidural steroid injection to her lower back 4 days ago and wondered if that was contributing.  She notes a mild global headache.  She states it feels like "I am not getting enough blood supply to my body or that I am having a stroke." She has a history of a TIA but reports this feels worse.    The history is provided by the patient and medical records. The history is limited by the condition of the patient.     Review of patient's allergies indicates:   Allergen Reactions    Adhesive tape-silicones Other (See Comments)     Other reaction(s): BLISTERS    Penicillins Hives and Itching    Sulfamethoxazole-trimethoprim Hives and Itching    Prosed ec [meth-hyos-atrp-m blue-ba-phsal] Hives, Itching and Swelling    Pyridium [phenazopyridine]     Cephalexin Nausea And Vomiting and Other (See Comments)     Dehydration     Past Medical History:   Diagnosis Date    Abnormal Pap smear     Anemia     history    Anxiety     Cancer     uterine-cancer cells     Colon polyps, next C-scope 2019. 4/22/2014    Dry eyes     Dyspareunia, female 6/24/2020    Essential hypertension, started in her mid 30's 1/18/2017    Family history of malignant neoplasm of pancreas 6/1/2018    Gastric bypass status for obesity 8/21/2012    GERD (gastroesophageal reflux disease)     Helicobacter pylori gastritis, 2008. 8/20/2014    Hemangioma of liver, stable 2010, 2012, right lobe 8/20/2014    Hematuria     History of cosmetic surgeries, tummy tuck 2011. " 4/5/2013    History of frquent UTI 6/24/2020    HTN (hypertension) 8/21/2012    120s-130s/80s    Kidney stone     Right sided sciatica 6/1/2018    Sleep apnea     patient does not use the C-PAP mask-cannot tolerate    Ureteral stone 4/8/2014    Urinary incontinence     Urinary retention     Varicose veins of lower extremity with inflammation 4/8/2015    Vertigo     mild    Vitamin D deficiency disease 6/1/2018     Past Surgical History:   Procedure Laterality Date    Abdominoplasty with Liposcuction      APPENDECTOMY      CERVICAL BIOPSY  W/ LOOP ELECTRODE EXCISION      Prior to hysterectomy    CHOLECYSTECTOMY      Laparoscopic    COLONOSCOPY N/A 2/13/2020    Procedure: COLONOSCOPY;  Surgeon: Pb Smith MD;  Location: [facility] ([address] [place] [address]);  Service: Endoscopy;  Laterality: N/A;    EPIDURAL STEROID INJECTION INTO LUMBAR SPINE Bilateral 3/15/2024    Procedure: B/L L5-S1 TFESI;  Surgeon: Glendy Banks MD;  Location: [facility];  Service: Pain Management;  Laterality: Bilateral;  20 mins    ESOPHAGOGASTRODUODENOSCOPY N/A 11/2/2021    Procedure: EGD (ESOPHAGOGASTRODUODENOSCOPY);  Surgeon: Clayton Martínez MD;  Location: [address]);  Service: Endoscopy;  Laterality: N/A;  fully vac. 3/15/21 / instr portal -ml    GASTRIC BYPASS      HERNIA REPAIR      HYSTERECTOMY  1980     TVH  both ovaries remain     Family History   Problem Relation Age of Onset    Heart disease Mother     Stroke Mother     Coronary artery disease Mother     Heart failure Mother     Obesity Mother     Cancer Father         pancreatic cancer    Diabetes Father     Obesity Father     No Known Problems Sister     No Known Problems Brother     Hearing loss Maternal Grandmother     No Known Problems Maternal Grandfather     No Known Problems Paternal Grandmother     No Known Problems Paternal Grandfather     Breast cancer Maternal Aunt     Colon cancer Maternal Uncle     No Known Problems Paternal Aunt     No Known Problems  Paternal Uncle     Ovarian cancer Neg Hx     Anesthesia problems Neg Hx     Amblyopia Neg Hx     Blindness Neg Hx     Cataracts Neg Hx     Glaucoma Neg Hx     Hypertension Neg Hx     Macular degeneration Neg Hx     Retinal detachment Neg Hx     Strabismus Neg Hx     Thyroid disease Neg Hx      Social History     Tobacco Use    Smoking status: Never    Smokeless tobacco: Never    Tobacco comments:     .  .   Occup:  .     Substance Use Topics    Alcohol use: No    Drug use: No         Physical Exam     Initial Vitals   BP Pulse Resp Temp SpO2   24 0922 24 1046 24 0922 24 0922 24 0922   (!) 193/86 108 18 98.1 °F (36.7 °C) 97 %      MAP       --                Physical Exam    Nursing note and vitals reviewed.  Constitutional: She appears well-developed and well-nourished. She is not diaphoretic. No distress.   HENT:   Head: Normocephalic and atraumatic.   Right Ear: External ear normal.   Left Ear: External ear normal.   Eyes: Conjunctivae and EOM are normal. Pupils are equal, round, and reactive to light.   Neck: Neck supple.   Normal range of motion.  Cardiovascular:  Normal rate, regular rhythm and intact distal pulses.           Pulmonary/Chest: No respiratory distress.   Abdominal: She exhibits no distension.   Musculoskeletal:         General: No edema. Normal range of motion.      Cervical back: Normal range of motion and neck supple.     Neurological: She is alert and oriented to person, place, and time. No sensory deficit. GCS score is 15. GCS eye subscore is 4. GCS verbal subscore is 5. GCS motor subscore is 6.   No facial droop, able to elevate her eyes bilaterally   Skin: Skin is warm and dry.   Psychiatric: She has a normal mood and affect. Her behavior is normal. Judgment and thought content normal.         ED Course   Procedures  Labs Reviewed   CBC W/ AUTO DIFFERENTIAL - Abnormal; Notable for the following components:       Result Value    RDW 14.7  (*)     MPV 9.0 (*)     Gran # (ANC) 7.9 (*)     Gran % 81.7 (*)     Lymph % 12.0 (*)     All other components within normal limits   COMPREHENSIVE METABOLIC PANEL - Abnormal; Notable for the following components:    Sodium 134 (*)     All other components within normal limits   LIPID PANEL - Abnormal; Notable for the following components:    Cholesterol 209 (*)     All other components within normal limits   URINALYSIS, REFLEX TO URINE CULTURE - Abnormal; Notable for the following components:    Specific Gravity, UA >1.030 (*)     Nitrite, UA Positive (*)     Leukocytes, UA 2+ (*)     All other components within normal limits    Narrative:     Specimen Source->Urine   URINALYSIS MICROSCOPIC - Abnormal; Notable for the following components:    WBC, UA 8 (*)     All other components within normal limits    Narrative:     Specimen Source->Urine   ISTAT PROCEDURE - Abnormal; Notable for the following components:    POC PTWBT 14.6 (*)     All other components within normal limits   HIV 1 / 2 ANTIBODY    Narrative:     Release to patient->Immediate   HEPATITIS C ANTIBODY    Narrative:     Release to patient->Immediate   PROTIME-INR   TSH   ISTAT CREATININE   POCT GLUCOSE     EKG Readings: (Independently Interpreted)   Initial Reading: No STEMI. Previous EKG: Compared with most recent EKG   EKG done at 10:49 a.m. sinus tachycardia rate of 112 significant motion artifact present  Repeat EKG done at 11:16 a.m. sinus tachycardia rate of 108 normal axis normal intervals low-voltage QRS     ECG Results              ECG 12 lead (Final result)        Collection Time Result Time QRS Duration OHS QTC Calculation    03/19/24 10:49:09 03/19/24 12:29:41 70 444                     Final result by Interface, Lab In [facility] (03/19/24 12:29:51)                   Narrative:    Test Reason : R29.818,    Vent. Rate : 112 BPM     Atrial Rate : 112 BPM     P-R Int : 152 ms          QRS Dur : 070 ms      QT Int : 326 ms       P-R-T Axes : 021  024 001 degrees     QTc Int : 444 ms    Sinus tachycardia with marked artifact  Low voltage QRS  Low septal forces  Abnormal ECG  When compared with ECG of 28-OCT-2022 06:16,  No significant change was found  Confirmed by Alonso DUMAS MD ([id number]) on 3/19/2024 12:29:38 PM    Referred By: SONIA   SELF           Confirmed By:Alonso DUMAS MD                                  Imaging Results              MRI Brain Without Contrast (Final result)  Result time 03/19/24 13:32:20      Final result by Wood Mera MD (03/19/24 13:32:20)                   Impression:      No evidence of acute hemorrhage or infarct.    Modest chronic small-vessel ischemic change in the supratentorial white matter.      Electronically signed by: Wood Mera MD  Date:    03/19/2024  Time:    13:32               Narrative:    EXAMINATION:  MRI BRAIN WITHOUT CONTRAST    CLINICAL HISTORY:  Transient ischemic attack (TIA);    TECHNIQUE:  Multiplanar multisequence MR imaging of the brain was performed without intravenous contrast.    COMPARISON:  CTA 03/19/2024    FINDINGS:  Intracranial Compartment:    Ventricles are stable in size, without evidence of hydrocephalus.    Modest patchy T2/FLAIR hyperintensity in the supratentorial white matter, nonspecific but most likely reflecting chronic small vessel ischemic changes. No diffusion restriction to suggest an acute infarction.  No remote major vascular distribution infarct. No evidence of recent or remote hemorrhage.  No significant intracranial mass effect or midline shift.    No extra-axial blood or fluid collections.    Normal vascular flow voids are preserved.    Skull/Extracranial Contents (limited evaluation):    Bone marrow signal intensity is unremarkable.                                       CTA STROKE MULTI-PHASE (Final result)  Result time 03/19/24 10:53:13      Final result by Gino Rankin DO (03/19/24 10:53:13)                   Impression:      CTA head: Unremarkable CTA of  To get better and follow your care plan as instructed. the head specifically without evidence for proximal significant stenosis or proximal large vessel occlusion    There is developmental variant with absent or hypoplastic left P1 segment of the PCA and essentially persistent fetal circulation of the left PCA via left posterior communicating artery.    CTA neck: Atherosclerotic plaquing of the carotid bifurcations and proximal ICAs with less than 50% proximal ICA stenosis by NASCET criteria.    CT head: No evidence for acute intracranial hemorrhage or sulcal effacement to suggest large territory recent infarction    Clinical correlation and further evaluation with MRI as warranted if patient compatible..      Electronically signed by: Gino Rankin DO  Date:    03/19/2024  Time:    10:53               Narrative:    EXAMINATION:  CTA STROKE MULTI-PHASE    CLINICAL HISTORY:  Neuro deficit, acute, stroke suspected;    TECHNIQUE:  5 mm axial images of the head pre and post contrast with 0.625 mm axial CTA images of the head neck post-contrast.  Coronal and sagittal MPR and MIP imaging was performed 100 ml of Omnipaque 350 contrast was injected intravenously    COMPARISON:  MRI brain 05/22/2021 and MRA head 05/17/2019    FINDINGS:  CT head: No evidence for acute intracranial hemorrhage or sulcal effacement. Slight prominent extra-axial space overlying the frontal lobes bilaterally likely developmental variant. There is no significant mass effect or midline shift. Ventricles normal in size without hydrocephalus.    CTA head: Bilateral distal cervical, petrous, cavernous and supraclinoid segments of the ICAs are patent without significant focal stenosis or proximal large vessel occlusion.. The anterior and middle cerebral arteries are patent without significant focal stenosis or large vessel occlusion.    Posterior circulation: Distal vertebral arteries, basilar artery and posterior cerebral arteries are patent with hypoplastic or absent left P1 segment PCA and essentially  persistent fetal circulation left PCA via left posterior communicating artery    CTA neck: Mild atherosclerotic plaquing of the arch and origin great vessels without significant focal stenosis. Slight ectasia of the ascending aorta measuring approximately 3.9 cm.  Vertebral artery origins from the subclavian arteries are within normal limits without significant focal stenosis. Vertebral arteries are patent throughout their course without focal stenosis or occlusion.    Right carotid: The right common carotid artery, carotid bifurcation and extracranial portions of the internal carotid artery are patent without significant focal stenosis.    Left carotid: The left common carotid artery, carotid bifurcation and extracranial portions of the internal carotid arteries are patent without significant focal stenosis.    There is atherosclerotic plaquing of the carotid bifurcations and proximal ICAs with less than 50% proximal ICA stenosis by NASCET criteria    Pharynx/larynx: Evaluation distorted by scatter artifact from dental metal and motion allowing for artifacts the nasopharynx, oropharynx, hypopharynx larynx and proximal trachea are grossly free of focal lesions    Oral cavity and the buccal space     distorted by dental metal.    Glands: No focal parotid, submandibular or thyroid lesion    No evidence for adenopathy throughout the neck by size criteria.    Degenerative changes cervical spine with grade 1 anterolisthesis of C3 on C4 and C4 on C5.  Allowing for degenerative changes there is no evidence for acute fracture of the cervical spine                                       Medications   iohexoL (OMNIPAQUE 350) injection 100 mL (100 mLs Intravenous Given 3/19/24 1037)   LORazepam injection 1 mg (1 mg Intravenous Given 3/19/24 1304)   ciprofloxacin HCl tablet 500 mg (500 mg Oral Given 3/19/24 1444)   HYDROcodone-acetaminophen 5-325 mg per tablet 1 tablet (1 tablet Oral Given 3/19/24 1444)   ketorolac injection  9.999 mg (9.999 mg Intravenous Given 3/19/24 1442)   sodium chloride 0.9% bolus 500 mL 500 mL (0 mLs Intravenous Stopped 3/19/24 1542)   ondansetron disintegrating tablet 4 mg (4 mg Oral Given 3/19/24 1619)     Medical Decision Making  Patient initially with symptoms concerning for a stroke and a stroke code was called from intake  I assumed care of the patient from the intake team and vascular neurology was consulted and patient went to CT  Her CTA did not show signs of an LVO, vascular neuro not recommending TNK as her symptoms are waxing and waning and are bilateral   MRI ordered and neg for stroke  Patient had a similar constellation of symptoms when she had an epidural injection previously so I think that is the most likely explanation at this point  She also has signs of a UTI and will be treated with antibiotics for this-limited options with her allergies, will be treated with cipro    Amount and/or Complexity of Data Reviewed  ECG/medicine tests: ordered and independent interpretation performed.    Risk  Prescription drug management.              Attending Attestation:         Attending Critical Care:   Critical Care Times:   ==============================================================  Total Critical Care Time - exclusive of procedural time: 35 minutes.  ==============================================================  Critical care was time spent personally by me on the following activities: obtaining history from patient or relative, review of old charts, ordering lab, x-rays, and/or EKG, development of treatment plan with patient or relative, ordering and performing treatments and interventions, evaluation of patient's response to treatment and re-evaluation of patient's conition.   Critical Care Condition: potentially life-threatening   Critical Care Comments: Stroke work up           ED Course as of 03/23/24 0706   Tue Mar 19, 2024   1022 Stroke code called [KB]      ED Course User Index  [KB]  Nilam Booth PA-C                           Clinical Impression:  Final diagnoses:  [R20.0] Numbness  [N39.0] Urinary tract infection without hematuria, site unspecified (Primary)  [M54.2] Neck pain          ED Disposition Condition    Discharge Stable          ED Prescriptions       Medication Sig Dispense Start Date End Date Auth. Provider    HYDROcodone-acetaminophen (NORCO) 5-325 mg per tablet () Take 1 tablet by mouth every 6 (six) hours as needed for Pain. 12 tablet 3/19/2024 3/22/2024 Sarah Morrison MD    ciprofloxacin HCl (CIPRO) 500 MG tablet Take 1 tablet (500 mg total) by mouth 2 (two) times daily. for 7 days 14 tablet 3/19/2024 3/26/2024 Sarah Morrison MD    ondansetron (ZOFRAN) 4 MG tablet () Take 1 tablet (4 mg total) by mouth every 8 (eight) hours as needed for Nausea. 12 tablet 3/19/2024 3/22/2024 Chavez Lui MD          Follow-up Information       Follow up With Specialties Details Why Contact Info    Tracy Chand MD Internal Medicine   [address]  [place] [id number] [phone]               Sarah Morrison MD  24 [id number]